# Patient Record
Sex: MALE | Race: WHITE
[De-identification: names, ages, dates, MRNs, and addresses within clinical notes are randomized per-mention and may not be internally consistent; named-entity substitution may affect disease eponyms.]

---

## 2017-05-30 PROCEDURE — 82570 ASSAY OF URINE CREATININE: CPT | Performed by: INTERNAL MEDICINE

## 2017-05-30 PROCEDURE — 82043 UR ALBUMIN QUANTITATIVE: CPT | Performed by: INTERNAL MEDICINE

## 2017-12-20 PROCEDURE — 81001 URINALYSIS AUTO W/SCOPE: CPT | Performed by: INTERNAL MEDICINE

## 2017-12-20 PROCEDURE — 82607 VITAMIN B-12: CPT | Performed by: INTERNAL MEDICINE

## 2017-12-20 PROCEDURE — 82746 ASSAY OF FOLIC ACID SERUM: CPT | Performed by: INTERNAL MEDICINE

## 2018-01-15 PROBLEM — I65.23 BILATERAL CAROTID ARTERY STENOSIS: Status: ACTIVE | Noted: 2018-01-15

## 2018-06-20 PROBLEM — I25.5 ISCHEMIC CARDIOMYOPATHY: Status: ACTIVE | Noted: 2018-06-20

## 2019-01-08 PROCEDURE — 81003 URINALYSIS AUTO W/O SCOPE: CPT | Performed by: INTERNAL MEDICINE

## 2019-01-08 PROCEDURE — 82570 ASSAY OF URINE CREATININE: CPT | Performed by: INTERNAL MEDICINE

## 2019-01-08 PROCEDURE — 82043 UR ALBUMIN QUANTITATIVE: CPT | Performed by: INTERNAL MEDICINE

## 2019-01-25 PROBLEM — Z95.810 PRESENCE OF AUTOMATIC CARDIOVERTER/DEFIBRILLATOR (AICD): Status: ACTIVE | Noted: 2019-01-25

## 2019-03-25 PROBLEM — Z95.810 ICD (IMPLANTABLE CARDIOVERTER-DEFIBRILLATOR) IN PLACE: Status: ACTIVE | Noted: 2019-01-25

## 2019-05-29 PROBLEM — R05.9 COUGH: Status: ACTIVE | Noted: 2019-05-29

## 2019-09-24 PROBLEM — G47.00 ORGANIC INSOMNIA: Status: ACTIVE | Noted: 2019-09-24

## 2019-10-24 PROBLEM — I48.0 PAROXYSMAL ATRIAL FIBRILLATION (HCC): Status: ACTIVE | Noted: 2019-10-24

## 2019-12-19 PROBLEM — D69.6 THROMBOCYTOPENIA, UNSPECIFIED (HCC): Status: ACTIVE | Noted: 2019-12-19

## 2020-01-01 ENCOUNTER — EXTERNAL RECORD (OUTPATIENT)
Dept: HEALTH INFORMATION MANAGEMENT | Facility: OTHER | Age: 80
End: 2020-01-01

## 2020-01-06 ENCOUNTER — TELEPHONE (OUTPATIENT)
Dept: CARDIOLOGY | Age: 80
End: 2020-01-06

## 2020-01-10 ENCOUNTER — TELEPHONE (OUTPATIENT)
Dept: CARDIOLOGY | Age: 80
End: 2020-01-10

## 2020-01-13 PROBLEM — R60.9 EDEMA: Status: ACTIVE | Noted: 2020-01-13

## 2020-01-13 PROBLEM — R06.02 SHORTNESS OF BREATH: Status: ACTIVE | Noted: 2020-01-13

## 2020-01-14 ENCOUNTER — TELEPHONE (OUTPATIENT)
Dept: CARDIOLOGY | Age: 80
End: 2020-01-14

## 2020-01-14 ENCOUNTER — OFFICE VISIT (OUTPATIENT)
Dept: CARDIOLOGY | Age: 80
End: 2020-01-14

## 2020-01-14 VITALS
HEIGHT: 71 IN | BODY MASS INDEX: 34.16 KG/M2 | DIASTOLIC BLOOD PRESSURE: 56 MMHG | HEART RATE: 74 BPM | SYSTOLIC BLOOD PRESSURE: 110 MMHG | WEIGHT: 244 LBS

## 2020-01-14 DIAGNOSIS — Z95.1 S/P CABG (CORONARY ARTERY BYPASS GRAFT): ICD-10-CM

## 2020-01-14 DIAGNOSIS — I25.10 CORONARY ARTERY DISEASE INVOLVING NATIVE CORONARY ARTERY OF NATIVE HEART WITHOUT ANGINA PECTORIS: ICD-10-CM

## 2020-01-14 DIAGNOSIS — R60.1 GENERALIZED EDEMA: ICD-10-CM

## 2020-01-14 DIAGNOSIS — Z79.4 TYPE 2 DIABETES MELLITUS WITH STAGE 3 CHRONIC KIDNEY DISEASE, WITH LONG-TERM CURRENT USE OF INSULIN (CMD): ICD-10-CM

## 2020-01-14 DIAGNOSIS — R06.02 SHORTNESS OF BREATH: Primary | ICD-10-CM

## 2020-01-14 DIAGNOSIS — I50.41 ACUTE COMBINED SYSTOLIC AND DIASTOLIC CHF, NYHA CLASS 4 (CMD): ICD-10-CM

## 2020-01-14 DIAGNOSIS — N18.30 TYPE 2 DIABETES MELLITUS WITH STAGE 3 CHRONIC KIDNEY DISEASE, WITH LONG-TERM CURRENT USE OF INSULIN (CMD): ICD-10-CM

## 2020-01-14 DIAGNOSIS — E11.22 TYPE 2 DIABETES MELLITUS WITH STAGE 3 CHRONIC KIDNEY DISEASE, WITH LONG-TERM CURRENT USE OF INSULIN (CMD): ICD-10-CM

## 2020-01-14 PROBLEM — E11.29 TYPE 2 DIABETES MELLITUS WITH KIDNEY COMPLICATION, WITH LONG-TERM CURRENT USE OF INSULIN (CMD): Status: ACTIVE | Noted: 2020-01-14

## 2020-01-14 PROCEDURE — 99205 OFFICE O/P NEW HI 60 MIN: CPT | Performed by: INTERNAL MEDICINE

## 2020-01-14 RX ORDER — CLOPIDOGREL BISULFATE 75 MG/1
75 TABLET ORAL DAILY
COMMUNITY

## 2020-01-14 RX ORDER — METOLAZONE 5 MG/1
5 TABLET ORAL 3 TIMES DAILY
COMMUNITY
End: 2020-01-17 | Stop reason: ALTCHOICE

## 2020-01-14 RX ORDER — AMIODARONE HYDROCHLORIDE 200 MG/1
200 TABLET ORAL DAILY
COMMUNITY

## 2020-01-14 RX ORDER — METOPROLOL SUCCINATE 25 MG/1
25 TABLET, EXTENDED RELEASE ORAL DAILY
COMMUNITY
End: 2020-02-20 | Stop reason: SDUPTHER

## 2020-01-14 RX ORDER — SPIRONOLACTONE 25 MG/1
25 TABLET ORAL DAILY
COMMUNITY
End: 2020-01-31 | Stop reason: SDUPTHER

## 2020-01-14 RX ORDER — INSULIN GLARGINE 100 [IU]/ML
INJECTION, SOLUTION SUBCUTANEOUS NIGHTLY
COMMUNITY

## 2020-01-14 SDOH — HEALTH STABILITY: MENTAL HEALTH: HOW OFTEN DO YOU HAVE A DRINK CONTAINING ALCOHOL?: NEVER

## 2020-01-14 ASSESSMENT — ENCOUNTER SYMPTOMS
SHORTNESS OF BREATH: 1
HEMOPTYSIS: 0
WEIGHT LOSS: 0
POOR WOUND HEALING: 1
BRUISES/BLEEDS EASILY: 0
HEMATOCHEZIA: 0
CHILLS: 0
ALLERGIC/IMMUNOLOGIC COMMENTS: NO NEW FOOD ALLERGIES
SUSPICIOUS LESIONS: 0
WEIGHT GAIN: 0
COUGH: 1
FEVER: 0

## 2020-01-15 ENCOUNTER — APPOINTMENT (OUTPATIENT)
Dept: CV DIAGNOSTICS | Facility: HOSPITAL | Age: 80
DRG: 286 | End: 2020-01-15
Attending: INTERNAL MEDICINE
Payer: MEDICARE

## 2020-01-15 ENCOUNTER — HOSPITAL ENCOUNTER (INPATIENT)
Facility: HOSPITAL | Age: 80
LOS: 8 days | Discharge: HOME HEALTH | DRG: 286 | End: 2020-01-23
Attending: INTERNAL MEDICINE | Admitting: INTERNAL MEDICINE
Payer: MEDICARE

## 2020-01-15 ENCOUNTER — APPOINTMENT (OUTPATIENT)
Dept: GENERAL RADIOLOGY | Facility: HOSPITAL | Age: 80
DRG: 286 | End: 2020-01-15
Attending: INTERNAL MEDICINE
Payer: MEDICARE

## 2020-01-15 DIAGNOSIS — I50.42 CHRONIC COMBINED SYSTOLIC AND DIASTOLIC CONGESTIVE HEART FAILURE (HCC): Primary | ICD-10-CM

## 2020-01-15 PROBLEM — I50.23 ACUTE ON CHRONIC SYSTOLIC CHF (CONGESTIVE HEART FAILURE), NYHA CLASS 3 (HCC): Status: ACTIVE | Noted: 2020-01-15

## 2020-01-15 LAB
ALBUMIN SERPL-MCNC: 3.2 G/DL (ref 3.4–5)
ALBUMIN/GLOB SERPL: 0.7 {RATIO} (ref 1–2)
ALP LIVER SERPL-CCNC: 109 U/L (ref 45–117)
ALT SERPL-CCNC: 23 U/L (ref 16–61)
ANION GAP SERPL CALC-SCNC: 7 MMOL/L (ref 0–18)
AST SERPL-CCNC: 33 U/L (ref 15–37)
BASOPHILS # BLD AUTO: 0.03 X10(3) UL (ref 0–0.2)
BASOPHILS NFR BLD AUTO: 0.5 %
BILIRUB SERPL-MCNC: 0.8 MG/DL (ref 0.1–2)
BILIRUB UR QL STRIP.AUTO: NEGATIVE
BUN BLD-MCNC: 35 MG/DL (ref 7–18)
BUN/CREAT SERPL: 16.7 (ref 10–20)
CALCIUM BLD-MCNC: 8.5 MG/DL (ref 8.5–10.1)
CHLORIDE SERPL-SCNC: 107 MMOL/L (ref 98–112)
CLARITY UR REFRACT.AUTO: CLEAR
CO2 SERPL-SCNC: 23 MMOL/L (ref 21–32)
COLOR UR AUTO: YELLOW
CREAT BLD-MCNC: 2.09 MG/DL (ref 0.7–1.3)
CREAT UR-SCNC: 56.4 MG/DL
CREAT UR-SCNC: 56.4 MG/DL
DEPRECATED RDW RBC AUTO: 68.2 FL (ref 35.1–46.3)
EOSINOPHIL # BLD AUTO: 0.13 X10(3) UL (ref 0–0.7)
EOSINOPHIL NFR BLD AUTO: 2.3 %
ERYTHROCYTE [DISTWIDTH] IN BLOOD BY AUTOMATED COUNT: 20.6 % (ref 11–15)
EST. AVERAGE GLUCOSE BLD GHB EST-MCNC: 140 MG/DL (ref 68–126)
GLOBULIN PLAS-MCNC: 4.4 G/DL (ref 2.8–4.4)
GLUCOSE BLD-MCNC: 144 MG/DL (ref 70–99)
GLUCOSE BLD-MCNC: 176 MG/DL (ref 70–99)
GLUCOSE UR STRIP.AUTO-MCNC: NEGATIVE MG/DL
HBA1C MFR BLD HPLC: 6.5 % (ref ?–5.7)
HCT VFR BLD AUTO: 33.2 % (ref 39–53)
HGB BLD-MCNC: 10.4 G/DL (ref 13–17.5)
IMM GRANULOCYTES # BLD AUTO: 0.03 X10(3) UL (ref 0–1)
IMM GRANULOCYTES NFR BLD: 0.5 %
KETONES UR STRIP.AUTO-MCNC: NEGATIVE MG/DL
LEUKOCYTE ESTERASE UR QL STRIP.AUTO: NEGATIVE
LYMPHOCYTES # BLD AUTO: 0.84 X10(3) UL (ref 1–4)
LYMPHOCYTES NFR BLD AUTO: 15 %
M PROTEIN MFR SERPL ELPH: 7.6 G/DL (ref 6.4–8.2)
MCH RBC QN AUTO: 28.8 PG (ref 26–34)
MCHC RBC AUTO-ENTMCNC: 31.3 G/DL (ref 31–37)
MCV RBC AUTO: 92 FL (ref 80–100)
MONOCYTES # BLD AUTO: 0.61 X10(3) UL (ref 0.1–1)
MONOCYTES NFR BLD AUTO: 10.9 %
NEUTROPHILS # BLD AUTO: 3.97 X10 (3) UL (ref 1.5–7.7)
NEUTROPHILS # BLD AUTO: 3.97 X10(3) UL (ref 1.5–7.7)
NEUTROPHILS NFR BLD AUTO: 70.8 %
NITRITE UR QL STRIP.AUTO: NEGATIVE
NT-PROBNP SERPL-MCNC: 4257 PG/ML (ref ?–450)
OSMOLALITY SERPL CALC.SUM OF ELEC: 296 MOSM/KG (ref 275–295)
PH UR STRIP.AUTO: 7 [PH] (ref 4.5–8)
PLATELET # BLD AUTO: 123 10(3)UL (ref 150–450)
PLATELET MORPHOLOGY: NORMAL
POTASSIUM SERPL-SCNC: 5.3 MMOL/L (ref 3.5–5.1)
PROT UR STRIP.AUTO-MCNC: 30 MG/DL
PROT UR-MCNC: 30.7 MG/DL
PROT/CREAT UR-RTO: 0.54
RBC # BLD AUTO: 3.61 X10(6)UL (ref 3.8–5.8)
RBC UR QL AUTO: NEGATIVE
SODIUM SERPL-SCNC: 131 MMOL/L
SODIUM SERPL-SCNC: 137 MMOL/L (ref 136–145)
SP GR UR STRIP.AUTO: 1.01 (ref 1–1.03)
UROBILINOGEN UR STRIP.AUTO-MCNC: <2 MG/DL
WBC # BLD AUTO: 5.6 X10(3) UL (ref 4–11)

## 2020-01-15 PROCEDURE — 71045 X-RAY EXAM CHEST 1 VIEW: CPT | Performed by: INTERNAL MEDICINE

## 2020-01-15 PROCEDURE — 93306 TTE W/DOPPLER COMPLETE: CPT | Performed by: INTERNAL MEDICINE

## 2020-01-15 PROCEDURE — 99233 SBSQ HOSP IP/OBS HIGH 50: CPT | Performed by: INTERNAL MEDICINE

## 2020-01-15 PROCEDURE — 99223 1ST HOSP IP/OBS HIGH 75: CPT | Performed by: INTERNAL MEDICINE

## 2020-01-15 RX ORDER — FLUTICASONE PROPIONATE 50 MCG
1-2 SPRAY, SUSPENSION (ML) NASAL DAILY
Status: DISCONTINUED | OUTPATIENT
Start: 2020-01-15 | End: 2020-01-23

## 2020-01-15 RX ORDER — TEMAZEPAM 15 MG/1
15 CAPSULE ORAL NIGHTLY PRN
Status: DISCONTINUED | OUTPATIENT
Start: 2020-01-15 | End: 2020-01-23

## 2020-01-15 RX ORDER — AMIODARONE HYDROCHLORIDE 200 MG/1
200 TABLET ORAL DAILY
Status: DISCONTINUED | OUTPATIENT
Start: 2020-01-16 | End: 2020-01-23

## 2020-01-15 RX ORDER — MILRINONE LACTATE 0.2 MG/ML
0.38 INJECTION, SOLUTION INTRAVENOUS CONTINUOUS
Status: DISCONTINUED | OUTPATIENT
Start: 2020-01-15 | End: 2020-01-20

## 2020-01-15 RX ORDER — DEXTROSE MONOHYDRATE 25 G/50ML
50 INJECTION, SOLUTION INTRAVENOUS
Status: DISCONTINUED | OUTPATIENT
Start: 2020-01-15 | End: 2020-01-23

## 2020-01-15 RX ORDER — HEPARIN SODIUM 5000 [USP'U]/ML
5000 INJECTION, SOLUTION INTRAVENOUS; SUBCUTANEOUS EVERY 12 HOURS SCHEDULED
Status: DISCONTINUED | OUTPATIENT
Start: 2020-01-15 | End: 2020-01-23

## 2020-01-15 RX ORDER — CLOPIDOGREL BISULFATE 75 MG/1
75 TABLET ORAL
Status: DISCONTINUED | OUTPATIENT
Start: 2020-01-16 | End: 2020-01-23

## 2020-01-15 RX ORDER — NITROGLYCERIN 0.4 MG/1
0.4 TABLET SUBLINGUAL EVERY 5 MIN PRN
Status: DISCONTINUED | OUTPATIENT
Start: 2020-01-15 | End: 2020-01-23

## 2020-01-15 RX ORDER — ACETAMINOPHEN 325 MG/1
650 TABLET ORAL EVERY 6 HOURS PRN
Status: DISCONTINUED | OUTPATIENT
Start: 2020-01-15 | End: 2020-01-23

## 2020-01-15 RX ORDER — SPIRONOLACTONE 25 MG/1
25 TABLET ORAL DAILY
Status: DISCONTINUED | OUTPATIENT
Start: 2020-01-15 | End: 2020-01-19

## 2020-01-15 RX ORDER — ALBUTEROL SULFATE 90 UG/1
2 AEROSOL, METERED RESPIRATORY (INHALATION) EVERY 6 HOURS PRN
Status: DISCONTINUED | OUTPATIENT
Start: 2020-01-15 | End: 2020-01-23

## 2020-01-15 RX ORDER — ENOXAPARIN SODIUM 100 MG/ML
40 INJECTION SUBCUTANEOUS DAILY
Status: DISCONTINUED | OUTPATIENT
Start: 2020-01-15 | End: 2020-01-15

## 2020-01-15 NOTE — CONSULTS
[facility]  Report of Consultation    Esther Koo Patient Status:  Inpatient    1940 MRN [id number]   [facility] 2NE-A Attending Letty Rutherford MD   Hosp Day # 0 PCP Tete Lopes MD     Reason for Consultation:  CKD GRAFT N/A 7/8/2015    Performed by Jennifer Salvador MD at [address] (EXTERNAL)  12/09/2019    PTA of the chronic occluded left mid to distal SFA, whcih was atherectomized , Dilated from 100% down **OR** glucose (DEX4) oral liquid 30 g, 30 g, Oral, Q15 Min PRN **OR** Glucose-Vitamin C (DEX-4) chewable tab 8 tablet, 8 tablet, Oral, Q15 Min PRN  •  Insulin Aspart Pen (NOVOLOG) 100 UNIT/ML flexpen 1-5 Units, 1-5 Units, Subcutaneous, TID CC and HS  •  A (H)     Blood Urea Nitrogen   Date Value   01/13/2020 35.0 mg/dL (H)   12/04/2019 35.0 mg/dL (H)   08/22/2019 35.0 mg/dL (H)   01/16/2019 20 MG/DL     Creatinine (mg/dL)   Date Value   01/15/2020 2.09 (H)   01/13/2020 1.85 (H)   12/04/2019 2.07 (H)   08/22

## 2020-01-15 NOTE — H&P
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  HEENT:  Eyes:  No diplopia or blurred vision. ENT:  No earache, sore throat or runny nose. CARDIOVASCULAR:  No chest pain  RESPIRATORY:  No cough, +shortness of breath, +orthopnea. GASTROINTESTINAL:  No nausea, vomiting or diarrhea.   Naomy Ala 01/15/2020    AST 33 01/15/2020    ALT 23 01/15/2020       CXR:  PENDING  Radiology: No results found. Outpatient records or previous hospital records reviewed.    [facility] hospitalist to continue to follow patient while in house  A total of 70 minutes miguelito

## 2020-01-15 NOTE — PLAN OF CARE
NURSING ADMISSION NOTE      Patient admitted via [facility] to room. Safety precautions initiated. Bed in low position. Call light in reach. Updated hisotry and pta meds.  Gave report to Areli Jiménez

## 2020-01-15 NOTE — H&P
Progress Notes  - documented in this encounter  Nyla Beltrán MD - 01/14/2020 12:00 PM CST  Formatting of this note might be different from the original.    [address]    PCP: Gabriella Al MD    Chief Complaint   Patient presents • Atorvastatin MYALGIA   • Furosemide Other (See Comments)   • Valsartan Other (See Comments)   • Ezetimibe Other (See Comments)   • Fish Oil Other (See Comments)   • Insulin Other (See Comments)   • Phenol Other (See Comments)     Presenting Medications scleral icterus  Neck: JVP normal, no carotid bruit bilaterally  Lungs: decrease base  Cardiac: Syst murmur S3   Abdomen: Liver ? enlarged  Musculoskeletal: ambulatory  Extremities: pulses intact 4+ EDEMA  Skin: Warm  Neuro: A & O  Psychiatric: Normal affe just established care with Dr. Guanaco Zayas in clinic and was found to be fluid overloaded. The patient states in the last year he has been admitted twice for fluid overload. He had been following at [address] [place] [address],[address] in [place]. He was just admitted last month.  After b

## 2020-01-16 ENCOUNTER — APPOINTMENT (OUTPATIENT)
Dept: ULTRASOUND IMAGING | Facility: HOSPITAL | Age: 80
DRG: 286 | End: 2020-01-16
Attending: INTERNAL MEDICINE
Payer: MEDICARE

## 2020-01-16 LAB
ANION GAP SERPL CALC-SCNC: 8 MMOL/L (ref 0–18)
BASOPHILS # BLD AUTO: 0.02 X10(3) UL (ref 0–0.2)
BASOPHILS NFR BLD AUTO: 0.4 %
BUN BLD-MCNC: 38 MG/DL (ref 7–18)
BUN/CREAT SERPL: 17.9 (ref 10–20)
CALCIUM BLD-MCNC: 8.9 MG/DL (ref 8.5–10.1)
CHLORIDE SERPL-SCNC: 105 MMOL/L (ref 98–112)
CO2 SERPL-SCNC: 24 MMOL/L (ref 21–32)
CREAT BLD-MCNC: 2.12 MG/DL (ref 0.7–1.3)
DEPRECATED RDW RBC AUTO: 67.2 FL (ref 35.1–46.3)
EOSINOPHIL # BLD AUTO: 0.12 X10(3) UL (ref 0–0.7)
EOSINOPHIL NFR BLD AUTO: 2.3 %
ERYTHROCYTE [DISTWIDTH] IN BLOOD BY AUTOMATED COUNT: 20.3 % (ref 11–15)
GLUCOSE BLD-MCNC: 125 MG/DL (ref 70–99)
GLUCOSE BLD-MCNC: 139 MG/DL (ref 70–99)
GLUCOSE BLD-MCNC: 140 MG/DL (ref 70–99)
GLUCOSE BLD-MCNC: 211 MG/DL (ref 70–99)
GLUCOSE BLD-MCNC: 272 MG/DL (ref 70–99)
HAV IGM SER QL: 2.2 MG/DL (ref 1.6–2.6)
HCT VFR BLD AUTO: 32.5 % (ref 39–53)
HGB BLD-MCNC: 10.3 G/DL (ref 13–17.5)
IMM GRANULOCYTES # BLD AUTO: 0.02 X10(3) UL (ref 0–1)
IMM GRANULOCYTES NFR BLD: 0.4 %
LYMPHOCYTES # BLD AUTO: 1 X10(3) UL (ref 1–4)
LYMPHOCYTES NFR BLD AUTO: 18.9 %
MCH RBC QN AUTO: 28.7 PG (ref 26–34)
MCHC RBC AUTO-ENTMCNC: 31.7 G/DL (ref 31–37)
MCV RBC AUTO: 90.5 FL (ref 80–100)
MONOCYTES # BLD AUTO: 0.68 X10(3) UL (ref 0.1–1)
MONOCYTES NFR BLD AUTO: 12.8 %
NEUTROPHILS # BLD AUTO: 3.46 X10 (3) UL (ref 1.5–7.7)
NEUTROPHILS # BLD AUTO: 3.46 X10(3) UL (ref 1.5–7.7)
NEUTROPHILS NFR BLD AUTO: 65.2 %
OSMOLALITY SERPL CALC.SUM OF ELEC: 295 MOSM/KG (ref 275–295)
PLATELET # BLD AUTO: 140 10(3)UL (ref 150–450)
POTASSIUM SERPL-SCNC: 4.8 MMOL/L (ref 3.5–5.1)
RBC # BLD AUTO: 3.59 X10(6)UL (ref 3.8–5.8)
SODIUM SERPL-SCNC: 137 MMOL/L (ref 136–145)
WBC # BLD AUTO: 5.3 X10(3) UL (ref 4–11)

## 2020-01-16 PROCEDURE — 99232 SBSQ HOSP IP/OBS MODERATE 35: CPT | Performed by: INTERNAL MEDICINE

## 2020-01-16 PROCEDURE — 93970 EXTREMITY STUDY: CPT | Performed by: INTERNAL MEDICINE

## 2020-01-16 PROCEDURE — 99233 SBSQ HOSP IP/OBS HIGH 50: CPT | Performed by: INTERNAL MEDICINE

## 2020-01-16 RX ORDER — ONDANSETRON 2 MG/ML
4 INJECTION INTRAMUSCULAR; INTRAVENOUS EVERY 6 HOURS PRN
Status: DISCONTINUED | OUTPATIENT
Start: 2020-01-16 | End: 2020-01-23

## 2020-01-16 RX ORDER — METOPROLOL SUCCINATE 25 MG/1
25 TABLET, EXTENDED RELEASE ORAL NIGHTLY
Status: DISCONTINUED | OUTPATIENT
Start: 2020-01-16 | End: 2020-01-23

## 2020-01-16 NOTE — PROGRESS NOTES
[facility]  Advanced Heart Failure Progress Note    Dakotah Bhatt Patient Status:  Inpatient    1940 MRN [id number]   [facility] 2NE-A Attending Kim Mccollum MD   Hosp Day # 1 PCP Amadou Cardenas MD     Subjective:  Has 92.0 01/15/2020    MCH 28.8 01/15/2020    MCHC 31.3 01/15/2020    RDW 20.6 01/15/2020    .0 01/15/2020     Lab Results   Component Value Date    INR 1.2 01/16/2019    INR 1.1 01/08/2019    INR 1.56 (H) 07/08/2015     No results for input(s): BNPML i Continuous  spironolactone (ALDACTONE) tab 25 mg, 25 mg, Oral, Daily  amiodarone HCl (PACERONE) tab 200 mg, 200 mg, Oral, Daily  metoprolol succinate (Toprol XL) partial tablet 12.5 mg, 12.5 mg, Oral, Nightly  milrinone (PRIMACOR) 20mg in D5W 100 mL premix Dionte Negron M.D., DAGMAR., OJ., F.E.S.C. Medical Director, Heart Failure Research, [address] Director, [address]  [place] [address], [address]  [address]

## 2020-01-16 NOTE — PROGRESS NOTES
[facility]  Progress Note    Jonna Monae Patient Status:  Inpatient    1940 MRN [id number]   [facility] 2NE-A Attending Cynthia Buck MD   Hosp Day # 1 PCP Kishan Alberto MD     No acute events overnight  Feels well pressure 116/59, pulse 86, temperature 97.8 °F (36.6 °C), temperature source Oral, resp. rate 20, weight 233 lb 11 oz (106 kg), SpO2 96 %. General: No acute distress. Alert and oriented x 3. HEENT: Moist mucous membranes. EOM-I.  PERRL  Neck: No lymphaden DM/HTN/cardio-renal sydrome; UA fairly bland; does have microalbuminuria  Suspect SVETLANA due to acute CHF and related decreased renal perfusion  Cr stable w/ ongoing diuresis  - continue bumex gtt - he is still roughly about 20 lbs over his typical weight  -

## 2020-01-16 NOTE — PLAN OF CARE
Rcv'd A/O/3  States I'm cold and shivering. Blankets and warm packs given  On tele with SR  LE +4 bilateral to ABD  Bilateral legs wrapped with karlex and ace wraps  Reported rt calf with open blister covered with mepilex.  Rt toe necrotic with betadine   L

## 2020-01-16 NOTE — PLAN OF CARE
Patient is a direct admit from Dr Liyah Cho office. [facility] hospitalists, Dr Summer Bonilla and Dr Viridiana Pizano consulted. Patient has a hx of renal insufficiency, heart failure, stent to left leg-necrotic toe with betadine, cabg and AICD a year ago.    NSR  Primacor and Diuril

## 2020-01-16 NOTE — PROGRESS NOTES
[facility] Hospitalist Team  Progress Note      Kerline Pair  7/26/1940    Assessment/Plan:     #LE edema, JARVIS likely 2/2   #Acute on chronic systolic and diastolic HF s/p ICD  -iv diuretics per cards -- on bumex gtt  -chest xr with pulm edema  -echo with wo Subcutaneous 2 times per day   • Insulin Aspart Pen  1-5 Units Subcutaneous TID CC and HS   • Fluticasone Propionate  1-2 spray Nasal Daily   • Clopidogrel Bisulfate  75 mg Oral Daily   • insulin glargine  10 Units Subcutaneous Nightly   • spironolactone

## 2020-01-17 LAB
ANION GAP SERPL CALC-SCNC: 6 MMOL/L (ref 0–18)
ATRIAL RATE: 81 BPM
BUN BLD-MCNC: 44 MG/DL (ref 7–18)
BUN/CREAT SERPL: 18.4 (ref 10–20)
CALCIUM BLD-MCNC: 9.6 MG/DL (ref 8.5–10.1)
CHLORIDE SERPL-SCNC: 100 MMOL/L (ref 98–112)
CO2 SERPL-SCNC: 29 MMOL/L (ref 21–32)
CREAT BLD-MCNC: 2.39 MG/DL (ref 0.7–1.3)
GLUCOSE BLD-MCNC: 145 MG/DL (ref 70–99)
GLUCOSE BLD-MCNC: 148 MG/DL (ref 70–99)
GLUCOSE BLD-MCNC: 159 MG/DL (ref 70–99)
GLUCOSE BLD-MCNC: 265 MG/DL (ref 70–99)
HAV IGM SER QL: 2 MG/DL (ref 1.6–2.6)
OSMOLALITY SERPL CALC.SUM OF ELEC: 294 MOSM/KG (ref 275–295)
P-R INTERVAL: 232 MS
POTASSIUM SERPL-SCNC: 4.7 MMOL/L (ref 3.5–5.1)
Q-T INTERVAL: 392 MS
QRS DURATION: 98 MS
QTC CALCULATION (BEZET): 455 MS
R AXIS: 39 DEGREES
SODIUM SERPL-SCNC: 128 MMOL/L
SODIUM SERPL-SCNC: 135 MMOL/L (ref 136–145)
T AXIS: 168 DEGREES
VENTRICULAR RATE: 81 BPM

## 2020-01-17 PROCEDURE — 99233 SBSQ HOSP IP/OBS HIGH 50: CPT | Performed by: INTERNAL MEDICINE

## 2020-01-17 NOTE — PROGRESS NOTES
[facility] Hospitalist Team  Progress Note      Jj Silver  7/26/1940    Assessment/Plan:     #LE edema, JARVIS likely 2/2   #Acute on chronic systolic and diastolic HF s/p ICD  -iv diuretics per cards -- on bumex gtt  -chest xr with pulm edema  -echo with wo 23.0 24.0 29.0   BUN 35.0* 35* 38* 44*   CREATSERUM 1.85* 2.09* 2.12* 2.39*   CA 8.6 8.5 8.9 9.6   MG  --   --  2.2 2.0   * 176* 125* 145*       Recent Labs   Lab 01/15/20  1404   ALT 23   AST 33   ALB 3.2*       Recent Labs   Lab 01/16/20  0721 01/

## 2020-01-17 NOTE — PLAN OF CARE
Aox4, room air, no complaints of chest pain or SOB, VSS, BLE edema +2 and redness, bumex and milirone infusing, daily weight, wound care to see for right toe, ID consult- ancef started, NSR on tele, safety precautions in place; bed in lowest position, call for suctioning and perform as needed  - Assess and instruct to report SOB or any respiratory difficulty  - Respiratory Therapy support as indicated  - Manage/alleviate anxiety  - Monitor for signs/symptoms of CO2 retention  Outcome: Progressing     Problem

## 2020-01-17 NOTE — CONSULTS
[facility]  Report of Inpatient Wound Care Consultation     Liset Mejia Patient Status:  Inpatient    1940 MRN [id number]   [facility] 2NE-A Attending Mary Ellis MD   Hosp Day # 2 PCP Grover Winston MD     REASON 2002   • COLONOSCOPY     • COLONOSCOPY     • EP DUAL CHAMBER ICD  01/16/2019    [facility] dual chamber ICD implant [employer]SC[employer].  Dr. Nate Sky   • [address] N/A 7/8/2015    Performed by Monica Chester MD at [id number] [place] [address] treat.\"  Patient with edema to B LE, stasis changes to B LE, no open wounds on L LE, dry eschar to the L distal 2nd toe, stable.    R LE with small wound to the tibial region, measures at (3.5cm x 2.1cm x 0.1cm) with 100% red, flat edges, no odor, scant dr

## 2020-01-17 NOTE — CM/SW NOTE
01/17/20 1000   CM/SW Referral Data   Referral Source Physician   Reason for Referral Discharge planning   Informant Patient   Social History   Recreational Drug/Alcohol Use no   Major Changes Last 6 Months no   Domestic/Partner Violence no   Suicidal I

## 2020-01-17 NOTE — PLAN OF CARE
Pt and VS remained stable this shift. Denies CP/SOB. Sat maintained in 90's on RA. Bumex and primacor gtt infusing, Appears to diuresis well. POC updated. Will cont to monitor.

## 2020-01-17 NOTE — DIETARY NOTE
Starr 34     Admitting diagnosis:  chf   Acute on chronic systolic CHF (congestive heart failure), NYHA class 3 (HCC)    Ht:  5' 10 \"  Wt: 96.6 kg (212 lb 15.4 oz).  This is 128 % of IBW  Body mass index is

## 2020-01-17 NOTE — PROGRESS NOTES
Pt c/o abdominal discomfort but improving. States, \"I think it was the lunch I had today\". A/Ox4. VSS. Breath sounds clear and diminished bilaterally. Room air. NSR on telemetry. Bilateral lower extremity edema +2 pitting.  L Leg redness, appearing to l

## 2020-01-17 NOTE — CONSULTS
INFECTIOUS DISEASE CONSULT NOTE    Robin Bonilla Patient Status:  Inpatient    1940 MRN [id number]   [facility] 2NE-A Attending Caleb Iniguez MD   Hosp Day # 2 PCP Lisa Ryan [facility] dual chamber ICD implant [employer]SC[employer].  Dr. Sonia Ferrari   • [address] N/A 7/8/2015    Performed by Dwight Antunez MD at [address] [facility] (EXTERNAL)  12/09/2019    PTA of the  •  Heparin Sodium (Porcine) 5000 UNIT/ML injection 5,000 Units, 5,000 Units, Subcutaneous, 2 times per day  •  glucose (DEX4) oral liquid 15 g, 15 g, Oral, Q15 Min PRN **OR** Glucose-Vitamin C (DEX-4) chewable tab 4 tablet, 4 tablet, Oral, Q15 Min PRN **OR Integument:  Erythema both feet, much better with leg elevation but with persistent erythema to L inner thigh.  RLE with compression dressings (not removed)        Laboratory Data:    Recent Labs   Lab 01/15/20  1404 01/16/20  1420   RBC 3.61* 3.59*   HGB 1 Result Date: 1/15/2020  PROCEDURE:  XR CHEST AP PORTABLE  (CPT=71045)  TECHNIQUE:  AP chest radiograph was obtained. COMPARISON:  [facility] , XR, XR CHEST PA + LAT CHEST (CPT=71020), 7/20/2015, 10:35.   INDICATIONS:  CHF  PATIENT STATED HISTORY: (As transcrib

## 2020-01-17 NOTE — PROGRESS NOTES
[facility]  Progress Note    Pat Cheek Patient Status:  Inpatient    1940 MRN [id number]   [facility] 2NE-A Attending Lilibeth Lawler MD   Hosp Day # 2 PCP Madina Yu MD     Feels better today  Excellent response Physical Exam:  Vital signs: Blood pressure 97/37, pulse 89, temperature 97.9 °F (36.6 °C), temperature source Oral, resp. rate 18, weight 212 lb 15.4 oz (96.6 kg), SpO2 97 %. General: No acute distress. Alert and oriented x 3.   HEENT: Moist mucous me MD  1/17/2020

## 2020-01-17 NOTE — PLAN OF CARE
Assumed patient care at [facility]. Vital signs stable. Patient alert and oriented x 4. Patient with good urine output. Patient complaining of mid abdominal pain and nausea - states he thought it was something he ate.   Patient was pointing right at umbilicus

## 2020-01-17 NOTE — PROGRESS NOTES
· [facility]      Subjective:  Dyspnea improving, able to lie flatter edema improving    Objective:  93/48  Afebrile  SR/ST  I/O incomplete  Weight down ? 20#  BUN/cr 44/2.39 K 4.7    Neuro:  Awake/alert  HEENT: JVD  Cardiac: S1 S2 regular 2/6 increased to 0.375 mcg/kg/min to better help with CHF tuneup. Patient had CABG 20 years ago. Also history of PCI. Patient denies ICD shocks. He has underlying ischemic cardiomyopathy with LVEF of 25%.   His chronic kidney disease, stable with CHF tuneup

## 2020-01-17 NOTE — PROGRESS NOTES
01/17/20 1530   Clinical Encounter Type   Visited With Patient  (Spent time with patient asking about his spiritual needs and concerns. Patient is happy to have his wife to care for him in addition to a caregiver at home. Patient is peaceful.  He prays d

## 2020-01-17 NOTE — PLAN OF CARE
Problem: Patient/Family Goals  Goal: Patient/Family Long Term Goal  Description  Patient's Long Term Goal: \"able to not have so much fluid on my legs\"    Interventions:  - HF protocol  - See additional Care Plan goals for specific interventions   Outco limits  Description  INTERVENTIONS:  - Monitor labs and rhythm and assess patient for signs and symptoms of electrolyte imbalances  - Administer electrolyte replacement as ordered  - Monitor response to electrolyte replacements, including rhythm and repeat

## 2020-01-18 LAB
ANION GAP SERPL CALC-SCNC: 9 MMOL/L (ref 0–18)
BUN BLD-MCNC: 42 MG/DL (ref 7–18)
BUN/CREAT SERPL: 17.1 (ref 10–20)
CALCIUM BLD-MCNC: 9.6 MG/DL (ref 8.5–10.1)
CHLORIDE SERPL-SCNC: 95 MMOL/L (ref 98–112)
CO2 SERPL-SCNC: 30 MMOL/L (ref 21–32)
CREAT BLD-MCNC: 2.45 MG/DL (ref 0.7–1.3)
GLUCOSE BLD-MCNC: 141 MG/DL (ref 70–99)
GLUCOSE BLD-MCNC: 142 MG/DL (ref 70–99)
GLUCOSE BLD-MCNC: 189 MG/DL (ref 70–99)
GLUCOSE BLD-MCNC: 191 MG/DL (ref 70–99)
GLUCOSE BLD-MCNC: 274 MG/DL (ref 70–99)
HAV IGM SER QL: 1.8 MG/DL (ref 1.6–2.6)
OSMOLALITY SERPL CALC.SUM OF ELEC: 291 MOSM/KG (ref 275–295)
POTASSIUM SERPL-SCNC: 4.3 MMOL/L (ref 3.5–5.1)
SODIUM SERPL-SCNC: 134 MMOL/L (ref 136–145)

## 2020-01-18 PROCEDURE — 99233 SBSQ HOSP IP/OBS HIGH 50: CPT | Performed by: INTERNAL MEDICINE

## 2020-01-18 PROCEDURE — 99232 SBSQ HOSP IP/OBS MODERATE 35: CPT | Performed by: INTERNAL MEDICINE

## 2020-01-18 RX ORDER — HYDROCODONE BITARTRATE AND ACETAMINOPHEN 5; 325 MG/1; MG/1
1 TABLET ORAL EVERY 4 HOURS PRN
Status: DISCONTINUED | OUTPATIENT
Start: 2020-01-18 | End: 2020-01-23

## 2020-01-18 RX ORDER — POLYETHYLENE GLYCOL 3350 17 G/17G
17 POWDER, FOR SOLUTION ORAL DAILY
Status: DISCONTINUED | OUTPATIENT
Start: 2020-01-18 | End: 2020-01-23

## 2020-01-18 NOTE — PROGRESS NOTES
[facility] Hospitalist Team  Progress Note      Dwaine Hunter  7/26/1940    Assessment/Plan:     #LE edema, JARVIS likely 2/2   #Acute on chronic systolic and diastolic HF s/p ICD  -iv diuretics per cards -- on bumex gtt, decreased today  -chest xr with pulm [place] CO2 25.6 23.0 24.0 29.0 30.0   BUN 35.0* 35* 38* 44* 42*   CREATSERUM 1.85* 2.09* 2.12* 2.39* 2.45*   CA 8.6 8.5 8.9 9.6 9.6   MG  --   --  2.2 2.0 1.8   * 176* 125* 145* 142*       Recent Labs   Lab 01/15/20  1404   ALT 23   AST 33   ALB 3.2*

## 2020-01-18 NOTE — PROGRESS NOTES
[facility]  Cardiology Progress Note    Subjective:  No chest pain or shortness of breath. Weight down over 40lbs thus far.     Objective:  /49 (BP Location: Right arm)   Pulse 92   Temp 97.6 °F (36.4 °C) (Oral)   Resp 18   Wt 201 lb 8 oz (91.4 Milrinone. Net weight loss ~43lbs.   Will need RHC when closer to euvolemia - likely can proceed early next week  · ICMP w/ AICD  · Mild to Moderate MR  · Moderate pHTN w/ moderate TR  · CKD Dr. Lissette Villanueva is managing diuretics  · CAD s/p CABG 2015 years and P MD SEE/KUMARS

## 2020-01-18 NOTE — PLAN OF CARE
Tele monitoring. I/o. Daily weight. Bumex 1 mg/hr (8 cc/hr)/primacor gtt 0.375 mcg/kg/hr (12.5 cc/hr). 1500 cc fluid restriction. Gluco scan qid. Metroprolol held this evening due to sbp 82/32. Will continue to monitor. Denies lightheadedness or dizziness. ventilation and oxygenation  Description  INTERVENTIONS:  - Assess for changes in respiratory status  - Assess for changes in mentation and behavior  - Position to facilitate oxygenation and minimize respiratory effort  - Oxygen supplementation based on ox

## 2020-01-18 NOTE — PROGRESS NOTES
[facility]  Nephrology Progress Note    Harjinder Pleasant Patient Status:  Inpatient    1940 MRN [id number]   [facility] 2NE-A Attending Leticia Jovel MD   Hosp Day # 3 PCP Adam Miller MD       SUBJECTIVE:  UOP remains e 149* 176* 125* 145* 142*       Recent Labs   Lab 01/15/20  1404   ALT 23   AST 33   ALB 3.2*       Recent Labs   Lab 01/17/20  1137 01/17/20  1828 01/17/20  2106 01/18/20  0647 01/18/20  1147   PGLU 159* 148* 265* 141* 274*       Meds:   PEG 3350 (MIRALAX) Oral, Daily  amiodarone HCl (PACERONE) tab 200 mg, 200 mg, Oral, Daily  milrinone (PRIMACOR) 20mg in D5W 100 mL premix infusion, 0.375 mcg/kg/min, Intravenous, Continuous          Impression/Plan:    #1.   CKD IV- hx CKD due to DM/HTN/decr renal perfusion w

## 2020-01-18 NOTE — PLAN OF CARE
Aox4, room air, no complaints of chest pain or SOB, NSR on tele, VSS, IV bumex, IV milirone, CR increased, IV bumex drip decreased 0.5 mg/hr, IV ancef cellulitis, Norco given for leg pain, possible LHC Mon or Tues, safety precautions in place; bed in [place] Assess the need for suctioning and perform as needed  - Assess and instruct to report SOB or any respiratory difficulty  - Respiratory Therapy support as indicated  - Manage/alleviate anxiety  - Monitor for signs/symptoms of CO2 retention  Outcome: Mack

## 2020-01-18 NOTE — VASCULAR ACCESS
Consulted to place a dificult IV. Per primary RN, Tala Page, pt has 2 working Iv's at this time and another is not needed. Will Dc consult.

## 2020-01-19 LAB
ANION GAP SERPL CALC-SCNC: 9 MMOL/L (ref 0–18)
ATRIAL RATE: 74 BPM
BASOPHILS # BLD AUTO: 0.02 X10(3) UL (ref 0–0.2)
BASOPHILS NFR BLD AUTO: 0.4 %
BUN BLD-MCNC: 45 MG/DL (ref 7–18)
BUN/CREAT SERPL: 17 (ref 10–20)
CALCIUM BLD-MCNC: 9.8 MG/DL (ref 8.5–10.1)
CHLORIDE SERPL-SCNC: 91 MMOL/L (ref 98–112)
CO2 SERPL-SCNC: 31 MMOL/L (ref 21–32)
CREAT BLD-MCNC: 2.65 MG/DL (ref 0.7–1.3)
DEPRECATED RDW RBC AUTO: 66.5 FL (ref 35.1–46.3)
EOSINOPHIL # BLD AUTO: 0.08 X10(3) UL (ref 0–0.7)
EOSINOPHIL NFR BLD AUTO: 1.6 %
ERYTHROCYTE [DISTWIDTH] IN BLOOD BY AUTOMATED COUNT: 19.7 % (ref 11–15)
GLUCOSE BLD-MCNC: 171 MG/DL (ref 70–99)
GLUCOSE BLD-MCNC: 180 MG/DL (ref 70–99)
GLUCOSE BLD-MCNC: 195 MG/DL (ref 70–99)
GLUCOSE BLD-MCNC: 228 MG/DL (ref 70–99)
GLUCOSE BLD-MCNC: 238 MG/DL (ref 70–99)
HAV IGM SER QL: 1.7 MG/DL (ref 1.6–2.6)
HCT VFR BLD AUTO: 33.4 % (ref 39–53)
HGB BLD-MCNC: 10.3 G/DL (ref 13–17.5)
IMM GRANULOCYTES # BLD AUTO: 0.01 X10(3) UL (ref 0–1)
IMM GRANULOCYTES NFR BLD: 0.2 %
LYMPHOCYTES # BLD AUTO: 0.94 X10(3) UL (ref 1–4)
LYMPHOCYTES NFR BLD AUTO: 19.3 %
MCH RBC QN AUTO: 28.2 PG (ref 26–34)
MCHC RBC AUTO-ENTMCNC: 30.8 G/DL (ref 31–37)
MCV RBC AUTO: 91.5 FL (ref 80–100)
MONOCYTES # BLD AUTO: 0.77 X10(3) UL (ref 0.1–1)
MONOCYTES NFR BLD AUTO: 15.8 %
NEUTROPHILS # BLD AUTO: 3.05 X10 (3) UL (ref 1.5–7.7)
NEUTROPHILS # BLD AUTO: 3.05 X10(3) UL (ref 1.5–7.7)
NEUTROPHILS NFR BLD AUTO: 62.7 %
OSMOLALITY SERPL CALC.SUM OF ELEC: 288 MOSM/KG (ref 275–295)
PLATELET # BLD AUTO: 151 10(3)UL (ref 150–450)
POTASSIUM SERPL-SCNC: 4.3 MMOL/L (ref 3.5–5.1)
Q-T INTERVAL: 440 MS
QRS DURATION: 184 MS
QTC CALCULATION (BEZET): 611 MS
R AXIS: -80 DEGREES
RBC # BLD AUTO: 3.65 X10(6)UL (ref 3.8–5.8)
SODIUM SERPL-SCNC: 106 MMOL/L
SODIUM SERPL-SCNC: 131 MMOL/L (ref 136–145)
T AXIS: 96 DEGREES
VENTRICULAR RATE: 116 BPM
WBC # BLD AUTO: 4.9 X10(3) UL (ref 4–11)

## 2020-01-19 PROCEDURE — 99232 SBSQ HOSP IP/OBS MODERATE 35: CPT | Performed by: INTERNAL MEDICINE

## 2020-01-19 PROCEDURE — 99233 SBSQ HOSP IP/OBS HIGH 50: CPT | Performed by: NURSE PRACTITIONER

## 2020-01-19 RX ORDER — METOCLOPRAMIDE HYDROCHLORIDE 5 MG/ML
5 INJECTION INTRAMUSCULAR; INTRAVENOUS EVERY 6 HOURS PRN
Status: DISCONTINUED | OUTPATIENT
Start: 2020-01-19 | End: 2020-01-23

## 2020-01-19 RX ORDER — METOCLOPRAMIDE 5 MG/1
5 TABLET ORAL EVERY 6 HOURS PRN
Status: DISCONTINUED | OUTPATIENT
Start: 2020-01-19 | End: 2020-01-23

## 2020-01-19 NOTE — PROGRESS NOTES
[facility]  Nephrology Progress Note    Eric Guerrero Patient Status:  Inpatient    1940 MRN [id number]   [facility] 2NE-A Attending Ashley Alba MD   Hosp Day # 4 PCP Xavier Marie MD       SUBJECTIVE:  Noted to have 2.45* 2.65*   CA 8.5 8.9 9.6 9.6 9.8   MG  --  2.2 2.0 1.8 1.7   * 125* 145* 142* 171*       Recent Labs   Lab 01/15/20  1404   ALT 23   AST 33   ALB 3.2*       Recent Labs   Lab 01/18/20  0647 01/18/20  1147 01/18/20  1657 01/18/20  2124 01/19/20 100 mL infusion, 0.5 mg/hr, Intravenous, Continuous  spironolactone (ALDACTONE) tab 25 mg, 25 mg, Oral, Daily  amiodarone HCl (PACERONE) tab 200 mg, 200 mg, Oral, Daily  milrinone (PRIMACOR) 20mg in D5W 100 mL premix infusion, 0.375 mcg/kg/min, Intravenous

## 2020-01-19 NOTE — PROGRESS NOTES
01/19/20 9375   Clinical Encounter Type   Visited With Health care provider  ( spoke to nurse practioner about the POLST consult for patient. He suggested they look into addressing it with the patient tomorrow.    left the POLST form on

## 2020-01-19 NOTE — PLAN OF CARE
Assumed care of patient around 0730. Patient sitting up in bed, ox4. Denies any chest pain or SOB. Normal sinus on tele. Room air. Bumex drip being held, cardiology APN aware. Milrinone infusing per order. +1 pitting to BLE.  Wound care completed to right l oxygenation  Description  INTERVENTIONS:  - Assess for changes in respiratory status  - Assess for changes in mentation and behavior  - Position to facilitate oxygenation and minimize respiratory effort  - Oxygen supplementation based on oxygen saturation

## 2020-01-19 NOTE — PLAN OF CARE
@7566- no relief of nausea and dry heaving with reglan and zofran  Daily weight- 193 lb, patients dry weight is 199 lb, on call cardiologist called to request pause on Bumex gtt- order confirmed to hold  In evening- [facility] notified of nausea, headache

## 2020-01-19 NOTE — PROGRESS NOTES
[facility]  Cardiology Progress Note    Subjective:  No chest pain or shortness of breath. Had episode of headache and dry heaves last night and subsequently bumex gtt stopped. Feels good. Lying flat without s.o.b. Has lost over 50lbs.     Objectiv to Bumex drip and Milrinone. Net weight loss ~51lbs.   RHC suggested   · ICMP w/ AICD  · Mild to Moderate MR  · Moderate pHTN w/ moderate TR  · CKD Dr. Viridiana Pizano is managing diuretics - creatinine up to 2.65.    · CAD s/p CABG 2015 years and PCI  · AICD '19 tachycardia likely due to Milrinone, which should improve as inotropes weaned. Uptitrate BB as able. No acute intervention required. Ultimately may need to have AV delay lengthened per Dr. Valentine Bowers, which can be done to decrease amount of pacing.   Thomas fo

## 2020-01-19 NOTE — PROGRESS NOTES
[facility] Pharmacy Note:  Renal Dose Adjustment for Metoclopramide (REGLAN)    Olegario Hennessy has been prescribed Metoclopramide (REGLAN) 10 mg every 6 hours as needed for nausea/vomiting. Estimated Creatinine Clearance: 24.8 mL/min (A) (based on SCr of 2.

## 2020-01-19 NOTE — PROGRESS NOTES
[facility] Hospitalist Team  Progress Note      Karli Galindo  7/26/1940    Assessment/Plan:     #LE edema, JARVIS likely 2/2   #Acute on chronic systolic and diastolic HF s/p ICD  -iv diuretics per cards -- was on bumex gtt, stopped  -chest xr with pulm edema 01/19/20  0622    137 135* 134* 131*   K 5.3* 4.8 4.7 4.3 4.3    105 100 95* 91*   CO2 23.0 24.0 29.0 30.0 31.0   BUN 35* 38* 44* 42* 45*   CREATSERUM 2.09* 2.12* 2.39* 2.45* 2.65*   CA 8.5 8.9 9.6 9.6 9.8   MG  --  2.2 2.0 1.8 1.7   * 1

## 2020-01-20 LAB
ANION GAP SERPL CALC-SCNC: 7 MMOL/L (ref 0–18)
BASOPHILS # BLD AUTO: 0.01 X10(3) UL (ref 0–0.2)
BASOPHILS NFR BLD AUTO: 0.2 %
BUN BLD-MCNC: 49 MG/DL (ref 7–18)
BUN/CREAT SERPL: 18.8 (ref 10–20)
CALCIUM BLD-MCNC: 9.1 MG/DL (ref 8.5–10.1)
CHLORIDE SERPL-SCNC: 91 MMOL/L (ref 98–112)
CO2 SERPL-SCNC: 35 MMOL/L (ref 21–32)
CREAT BLD-MCNC: 2.6 MG/DL (ref 0.7–1.3)
DEPRECATED RDW RBC AUTO: 64.3 FL (ref 35.1–46.3)
EOSINOPHIL # BLD AUTO: 0.2 X10(3) UL (ref 0–0.7)
EOSINOPHIL NFR BLD AUTO: 4.3 %
ERYTHROCYTE [DISTWIDTH] IN BLOOD BY AUTOMATED COUNT: 19.5 % (ref 11–15)
GLUCOSE BLD-MCNC: 144 MG/DL (ref 70–99)
GLUCOSE BLD-MCNC: 147 MG/DL (ref 70–99)
GLUCOSE BLD-MCNC: 161 MG/DL (ref 70–99)
GLUCOSE BLD-MCNC: 167 MG/DL (ref 70–99)
GLUCOSE BLD-MCNC: 210 MG/DL (ref 70–99)
HCT VFR BLD AUTO: 31.5 % (ref 39–53)
HGB BLD-MCNC: 10.1 G/DL (ref 13–17.5)
IMM GRANULOCYTES # BLD AUTO: 0.01 X10(3) UL (ref 0–1)
IMM GRANULOCYTES NFR BLD: 0.2 %
LYMPHOCYTES # BLD AUTO: 1.14 X10(3) UL (ref 1–4)
LYMPHOCYTES NFR BLD AUTO: 24.4 %
MCH RBC QN AUTO: 28.9 PG (ref 26–34)
MCHC RBC AUTO-ENTMCNC: 32.1 G/DL (ref 31–37)
MCV RBC AUTO: 90.3 FL (ref 80–100)
MONOCYTES # BLD AUTO: 0.84 X10(3) UL (ref 0.1–1)
MONOCYTES NFR BLD AUTO: 18 %
NEUTROPHILS # BLD AUTO: 2.47 X10 (3) UL (ref 1.5–7.7)
NEUTROPHILS # BLD AUTO: 2.47 X10(3) UL (ref 1.5–7.7)
NEUTROPHILS NFR BLD AUTO: 52.9 %
OSMOLALITY SERPL CALC.SUM OF ELEC: 292 MOSM/KG (ref 275–295)
PLATELET # BLD AUTO: 137 10(3)UL (ref 150–450)
POTASSIUM SERPL-SCNC: 4.1 MMOL/L (ref 3.5–5.1)
RBC # BLD AUTO: 3.49 X10(6)UL (ref 3.8–5.8)
SODIUM SERPL-SCNC: 133 MMOL/L (ref 136–145)
WBC # BLD AUTO: 4.7 X10(3) UL (ref 4–11)

## 2020-01-20 PROCEDURE — 99232 SBSQ HOSP IP/OBS MODERATE 35: CPT | Performed by: INTERNAL MEDICINE

## 2020-01-20 PROCEDURE — 99233 SBSQ HOSP IP/OBS HIGH 50: CPT | Performed by: INTERNAL MEDICINE

## 2020-01-20 RX ORDER — DOCUSATE SODIUM 100 MG/1
100 CAPSULE, LIQUID FILLED ORAL 2 TIMES DAILY
Status: DISCONTINUED | OUTPATIENT
Start: 2020-01-20 | End: 2020-01-23

## 2020-01-20 RX ORDER — SODIUM CHLORIDE 9 MG/ML
INJECTION, SOLUTION INTRAVENOUS
Status: COMPLETED | OUTPATIENT
Start: 2020-01-21 | End: 2020-01-21

## 2020-01-20 RX ORDER — MILRINONE LACTATE 0.2 MG/ML
0.25 INJECTION, SOLUTION INTRAVENOUS CONTINUOUS
Status: DISCONTINUED | OUTPATIENT
Start: 2020-01-20 | End: 2020-01-21

## 2020-01-20 NOTE — PROGRESS NOTES
[facility]  Nephrology Progress Note    Tucker Juarez Patient Status:  Inpatient    1940 MRN [id number]   [facility] 2NE-A Attending Victoriano Carter MD   Hosp Day # 5 PCP Manny Vera MD       SUBJECTIVE:  C/o hunger/th 44* 42* 45* 49*   CREATSERUM 2.12* 2.39* 2.45* 2.65* 2.60*   CA 8.9 9.6 9.6 9.8 9.1   MG 2.2 2.0 1.8 1.7  --    * 145* 142* 171* 147*       Recent Labs   Lab 01/15/20  1404   ALT 23   AST 33   ALB 3.2*       Recent Labs   Lab 01/18/20  2124 01/19/20 Daily  bumetanide (BUMEX) 12.5 mg in sodium chloride 0.9% 100 mL infusion, 0.5 mg/hr, Intravenous, Continuous  amiodarone HCl (PACERONE) tab 200 mg, 200 mg, Oral, Daily  milrinone (PRIMACOR) 20mg in D5W 100 mL premix infusion, 0.375 mcg/kg/min, Intravenous

## 2020-01-20 NOTE — PROGRESS NOTES
INFECTIOUS DISEASE PROGRESS NOTE    Светлана Scales Patient Status:  Inpatient    1940 MRN [id number]   [facility] 2NE-A Attending Reese Castañeda MD   Hosp Day # 5 PCP Maria Del Carmen Stuart MCG/ACT inhaler 2 puff, 2 puff, Inhalation, Q6H PRN  •  Fluticasone Propionate (FLONASE) 50 MCG/ACT nasal spray 1-2 spray, 1-2 spray, Nasal, Daily  •  Clopidogrel Bisulfate (PLAVIX) tab 75 mg, 75 mg, Oral, Daily  •  bumetanide (BUMEX) 12.5 mg in sodium chl --   --    AST 33  --   --   --   --    ALT 23  --   --   --   --    BILT 0.8  --   --   --   --    TP 7.6  --   --   --   --     < > = values in this interval not displayed.        Microbiology    Reviewed in EMR,     Radiology: reviewed     ASSESSMENT:

## 2020-01-20 NOTE — PROGRESS NOTES
[facility]  Cardiology Progress Note    Dakotah Bhatt Patient Status:  Inpatient    1940 MRN [id number]   [facility] 2NE-A Attending Kim Mccollum MD   Hosp Day # 5 PCP Amdaou Cardenas MD     Subjective:  No chest pain, ceFAZolin  1 g Intravenous Q12H   • metoprolol succinate  25 mg Oral Nightly   • Heparin Sodium (Porcine)  5,000 Units Subcutaneous 2 times per day   • Fluticasone Propionate  1-2 spray Nasal Daily   • Clopidogrel Bisulfate  75 mg Oral Daily   • amiodarone reviewed. The patient was admitted from Dr. Jannet [facility] complaining of worsening shortness of breath, worsening fatigue and leg edema. He also had some orthopnea and could not sleep prior to admission. Patient feels much better.   Dyspnea on exer hospital  -Underlying ischemic cardiomyopathy with LVEF of 15-20% -was 20% in 2015  -Coronary artery disease with history of three-vessel CABG July 2015 and history of remote PCI 20 years ago  -Chronic renal disease -mild worsening of kidney function with

## 2020-01-20 NOTE — PROGRESS NOTES
[facility] Hospitalist Team  Progress Note      Dakotah Bhatt  7/26/1940    Assessment/Plan:     #LE edema, JARVIS likely 2/2   #Acute on chronic systolic and diastolic HF s/p ICD  -iv diuretics per cards -- was on bumex gtt, stopped- sig improved in volume stat 01/18/20  0629 01/19/20  0622 01/20/20  0707    135* 134* 131* 133*   K 4.8 4.7 4.3 4.3 4.1    100 95* 91* 91*   CO2 24.0 29.0 30.0 31.0 35.0*   BUN 38* 44* 42* 45* 49*   CREATSERUM 2.12* 2.39* 2.45* 2.65* 2.60*   CA 8.9 9.6 9.6 9.8 9.1   MG 2.

## 2020-01-20 NOTE — PROGRESS NOTES
Problem: Patient/Family Goals  Goal: Patient/Family Long Term Goal  Description  Patient's Long Term Goal: \"able to not have so much fluid on my legs\"    Interventions:  - HF protocol  - See additional Care Plan goals for specific interventions   Outcome limits  Description  INTERVENTIONS:  - Monitor labs and rhythm and assess patient for signs and symptoms of electrolyte imbalances  - Administer electrolyte replacement as ordered  - Monitor response to electrolyte replacements, including rhythm and repeat

## 2020-01-21 LAB
ANION GAP SERPL CALC-SCNC: 5 MMOL/L (ref 0–18)
ANION GAP SERPL CALC-SCNC: 8 MMOL/L (ref 0–18)
ATRIAL RATE: 78 BPM
BUN BLD-MCNC: 48 MG/DL (ref 7–18)
BUN BLD-MCNC: 52 MG/DL (ref 7–18)
BUN/CREAT SERPL: 20.3 (ref 10–20)
BUN/CREAT SERPL: 23.1 (ref 10–20)
CALCIUM BLD-MCNC: 8.7 MG/DL (ref 8.5–10.1)
CALCIUM BLD-MCNC: 9.2 MG/DL (ref 8.5–10.1)
CHLORIDE SERPL-SCNC: 93 MMOL/L (ref 98–112)
CHLORIDE SERPL-SCNC: 93 MMOL/L (ref 98–112)
CO2 SERPL-SCNC: 32 MMOL/L (ref 21–32)
CO2 SERPL-SCNC: 33 MMOL/L (ref 21–32)
CREAT BLD-MCNC: 2.25 MG/DL (ref 0.7–1.3)
CREAT BLD-MCNC: 2.37 MG/DL (ref 0.7–1.3)
DEPRECATED HBV CORE AB SER IA-ACNC: 70.8 NG/ML (ref 30–530)
GLUCOSE BLD-MCNC: 139 MG/DL (ref 70–99)
GLUCOSE BLD-MCNC: 140 MG/DL (ref 70–99)
GLUCOSE BLD-MCNC: 148 MG/DL (ref 70–99)
GLUCOSE BLD-MCNC: 169 MG/DL (ref 70–99)
GLUCOSE BLD-MCNC: 226 MG/DL (ref 70–99)
GLUCOSE BLD-MCNC: 234 MG/DL (ref 70–99)
IRON SATURATION: 10 % (ref 20–50)
IRON SERPL-MCNC: 41 UG/DL (ref 65–175)
OSMOLALITY SERPL CALC.SUM OF ELEC: 288 MOSM/KG (ref 275–295)
OSMOLALITY SERPL CALC.SUM OF ELEC: 296 MOSM/KG (ref 275–295)
POTASSIUM SERPL-SCNC: 4 MMOL/L (ref 3.5–5.1)
POTASSIUM SERPL-SCNC: 4.2 MMOL/L (ref 3.5–5.1)
Q-T INTERVAL: 422 MS
QRS DURATION: 100 MS
QTC CALCULATION (BEZET): 477 MS
R AXIS: 36 DEGREES
SODIUM SERPL-SCNC: 131 MMOL/L (ref 136–145)
SODIUM SERPL-SCNC: 133 MMOL/L (ref 136–145)
T AXIS: 197 DEGREES
TOTAL IRON BINDING CAPACITY: 417 UG/DL (ref 240–450)
TRANSFERRIN SERPL-MCNC: 280 MG/DL (ref 200–360)
VENTRICULAR RATE: 77 BPM

## 2020-01-21 PROCEDURE — 99233 SBSQ HOSP IP/OBS HIGH 50: CPT | Performed by: INTERNAL MEDICINE

## 2020-01-21 RX ORDER — TOLVAPTAN 15 MG/1
15 TABLET ORAL ONCE
Status: COMPLETED | OUTPATIENT
Start: 2020-01-21 | End: 2020-01-21

## 2020-01-21 NOTE — PROGRESS NOTES
[facility]  Nephrology Progress Note    Josué Forman Patient Status:  Inpatient    1940 MRN [id number]   [facility] 2NE-A Attending Pradip Dhillon MD   Hosp Day # 6 PCP Latha Shanks MD       SUBJECTIVE:    No acute ev Inhalation, Q6H PRN  Fluticasone Propionate (FLONASE) 50 MCG/ACT nasal spray 1-2 spray, 1-2 spray, Nasal, Daily  Clopidogrel Bisulfate (PLAVIX) tab 75 mg, 75 mg, Oral, Daily  amiodarone HCl (PACERONE) tab 200 mg, 200 mg, Oral, Daily            Physical Exa improvement in volume status/wt/edema. Tolerate higher creat as needed to maintain vol status. bumex gtt stopped. Creat stable. -  Will await cardiology recs regarding maintenance diuretics -  cardiac cath pending    2.  CHF- noted to have markedly reduc

## 2020-01-21 NOTE — CM/SW NOTE
Care Progression Note:  Active Acute Medical Issue:   Acute on chronic CHF, s/p ICD, EF 15%, plan is for RHC and LHC pending renal function status    Other Contributing Medical Factors/Dx. :   CAD, moderated PAH, CKD, anemia, hyponatremia, LE cellulitis

## 2020-01-21 NOTE — PROGRESS NOTES
[facility]  Cardiology Progress Note    Subjective:  No chest pain or shortness of breath. Tired of being in the hospital.  Discussed his renal function related to cardiac cath dye load.   Will likely plan to hold cath today and reassess renal functio    · CAD s/p CABGx 3 7/2015, remote PCI 16 years ago (cath and op report have been placed in the chart)   · AICD '19  · Possible cellulitis both LE - ID following on Cefazolin  · H/o SVT, stable on Amiodarone - HR have improved off Milrinone.     · Hyponatr BELEN ROBERTSON., OJ., FRAKESH. Medical Director, Heart Failure Research, [address] Director, [address]  [address] [place] [address], [address]  [place]

## 2020-01-21 NOTE — PLAN OF CARE
ALERT AND ORIENTED X4 ON TELE MONITOR HR 70'S SINUS RHYTHM. MILLINONE GTT AT 0.25 MG/KG/MIN AT 8.3 CC/HR IN PROGRESS PATENT AND INTACT. INSTRUCTED NPO AFTER MN FOR RIGHT AND LEFT HEART CATH IN AM AND VERBALIZED UNDERSTANDING.  TYLENOL 2 TABS PO GIVEN FOR HE

## 2020-01-21 NOTE — PROGRESS NOTES
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

## 2020-01-21 NOTE — PLAN OF CARE
NURSING NOTES 6948-3447:  Pt received asleep, arousable, ox4. Room air. SR. NPO after MN for heart cath today. Milrinone drip lower rate to 0.175 mcg/kg/min as ordered at MN. Denies pain. 0530: NS at 20 cc/hr started. R leg dressing changed as ordered.   Favio Gamez

## 2020-01-22 ENCOUNTER — APPOINTMENT (OUTPATIENT)
Dept: INTERVENTIONAL RADIOLOGY/VASCULAR | Facility: HOSPITAL | Age: 80
DRG: 286 | End: 2020-01-22
Attending: INTERNAL MEDICINE
Payer: MEDICARE

## 2020-01-22 LAB
ANION GAP SERPL CALC-SCNC: 8 MMOL/L (ref 0–18)
BUN BLD-MCNC: 47 MG/DL (ref 7–18)
BUN/CREAT SERPL: 21.3 (ref 10–20)
CALCIUM BLD-MCNC: 9.4 MG/DL (ref 8.5–10.1)
CHLORIDE SERPL-SCNC: 99 MMOL/L (ref 98–112)
CO2 SERPL-SCNC: 30 MMOL/L (ref 21–32)
CREAT BLD-MCNC: 2.21 MG/DL (ref 0.7–1.3)
GLUCOSE BLD-MCNC: 126 MG/DL (ref 70–99)
GLUCOSE BLD-MCNC: 130 MG/DL (ref 70–99)
GLUCOSE BLD-MCNC: 143 MG/DL (ref 70–99)
GLUCOSE BLD-MCNC: 180 MG/DL (ref 70–99)
GLUCOSE BLD-MCNC: 250 MG/DL (ref 70–99)
OSMOLALITY SERPL CALC.SUM OF ELEC: 298 MOSM/KG (ref 275–295)
POTASSIUM SERPL-SCNC: 4.5 MMOL/L (ref 3.5–5.1)
SODIUM SERPL-SCNC: 137 MMOL/L (ref 136–145)
SODIUM SERPL-SCNC: 39 MMOL/L

## 2020-01-22 PROCEDURE — 76937 US GUIDE VASCULAR ACCESS: CPT | Performed by: INTERNAL MEDICINE

## 2020-01-22 PROCEDURE — B213YZZ FLUOROSCOPY OF MULTIPLE CORONARY ARTERY BYPASS GRAFTS USING OTHER CONTRAST: ICD-10-PCS | Performed by: INTERNAL MEDICINE

## 2020-01-22 PROCEDURE — 4A023N8 MEASUREMENT OF CARDIAC SAMPLING AND PRESSURE, BILATERAL, PERCUTANEOUS APPROACH: ICD-10-PCS | Performed by: INTERNAL MEDICINE

## 2020-01-22 PROCEDURE — B310YZZ FLUOROSCOPY OF THORACIC AORTA USING OTHER CONTRAST: ICD-10-PCS | Performed by: INTERNAL MEDICINE

## 2020-01-22 PROCEDURE — 93457 R HRT ART/GRFT ANGIO: CPT | Performed by: INTERNAL MEDICINE

## 2020-01-22 PROCEDURE — B211YZZ FLUOROSCOPY OF MULTIPLE CORONARY ARTERIES USING OTHER CONTRAST: ICD-10-PCS | Performed by: INTERNAL MEDICINE

## 2020-01-22 PROCEDURE — 99232 SBSQ HOSP IP/OBS MODERATE 35: CPT | Performed by: INTERNAL MEDICINE

## 2020-01-22 PROCEDURE — 99152 MOD SED SAME PHYS/QHP 5/>YRS: CPT | Performed by: INTERNAL MEDICINE

## 2020-01-22 PROCEDURE — B214YZZ FLUOROSCOPY OF RIGHT HEART USING OTHER CONTRAST: ICD-10-PCS | Performed by: INTERNAL MEDICINE

## 2020-01-22 PROCEDURE — 75710 ARTERY X-RAYS ARM/LEG: CPT | Performed by: INTERNAL MEDICINE

## 2020-01-22 PROCEDURE — B312YZZ FLUOROSCOPY OF LEFT SUBCLAVIAN ARTERY USING OTHER CONTRAST: ICD-10-PCS | Performed by: INTERNAL MEDICINE

## 2020-01-22 RX ORDER — SODIUM CHLORIDE 9 MG/ML
INJECTION, SOLUTION INTRAVENOUS CONTINUOUS
Status: ACTIVE | OUTPATIENT
Start: 2020-01-22 | End: 2020-01-22

## 2020-01-22 RX ORDER — MIDAZOLAM HYDROCHLORIDE 1 MG/ML
INJECTION INTRAMUSCULAR; INTRAVENOUS
Status: COMPLETED
Start: 2020-01-22 | End: 2020-01-22

## 2020-01-22 RX ORDER — ASPIRIN 81 MG/1
81 TABLET ORAL DAILY
Status: DISCONTINUED | OUTPATIENT
Start: 2020-01-23 | End: 2020-01-23

## 2020-01-22 RX ORDER — ACETAMINOPHEN AND CODEINE PHOSPHATE 300; 30 MG/1; MG/1
2 TABLET ORAL EVERY 4 HOURS PRN
Status: DISCONTINUED | OUTPATIENT
Start: 2020-01-22 | End: 2020-01-23

## 2020-01-22 RX ORDER — ACETAMINOPHEN AND CODEINE PHOSPHATE 300; 30 MG/1; MG/1
1 TABLET ORAL EVERY 4 HOURS PRN
Status: DISCONTINUED | OUTPATIENT
Start: 2020-01-22 | End: 2020-01-23

## 2020-01-22 RX ORDER — SODIUM CHLORIDE 9 MG/ML
INJECTION, SOLUTION INTRAVENOUS CONTINUOUS
Status: DISCONTINUED | OUTPATIENT
Start: 2020-01-22 | End: 2020-01-23

## 2020-01-22 RX ORDER — ACETAMINOPHEN 325 MG/1
650 TABLET ORAL EVERY 4 HOURS PRN
Status: DISCONTINUED | OUTPATIENT
Start: 2020-01-22 | End: 2020-01-23

## 2020-01-22 RX ORDER — TOLVAPTAN 15 MG/1
15 TABLET ORAL ONCE
Status: COMPLETED | OUTPATIENT
Start: 2020-01-22 | End: 2020-01-22

## 2020-01-22 RX ORDER — ISOSORBIDE DINITRATE 5 MG/1
5 TABLET ORAL
Status: DISCONTINUED | OUTPATIENT
Start: 2020-01-22 | End: 2020-01-22

## 2020-01-22 RX ORDER — HYDRALAZINE HYDROCHLORIDE 25 MG/1
25 TABLET, FILM COATED ORAL EVERY 8 HOURS SCHEDULED
Status: DISCONTINUED | OUTPATIENT
Start: 2020-01-23 | End: 2020-01-23

## 2020-01-22 RX ORDER — HEPARIN SODIUM 5000 [USP'U]/ML
INJECTION, SOLUTION INTRAVENOUS; SUBCUTANEOUS
Status: COMPLETED
Start: 2020-01-22 | End: 2020-01-22

## 2020-01-22 RX ORDER — ISOSORBIDE DINITRATE 5 MG/1
5 TABLET ORAL EVERY 8 HOURS
Status: DISCONTINUED | OUTPATIENT
Start: 2020-01-23 | End: 2020-01-23

## 2020-01-22 RX ORDER — LIDOCAINE HYDROCHLORIDE 10 MG/ML
INJECTION, SOLUTION EPIDURAL; INFILTRATION; INTRACAUDAL; PERINEURAL
Status: COMPLETED
Start: 2020-01-22 | End: 2020-01-22

## 2020-01-22 NOTE — PLAN OF CARE
ALERT AND ORIENTED X4 ON TELE MONITOR HR 70'S SINUS RHYTHM. INSTRUCTED NPO AFTER MN FOR RIGHT AND LEFT HEART CATH IN AM AND VERBALIZED UNDERSTANDING. TYLENOL 2 TABS PO GIVEN FOR HEADACHE. ALL NEEDS ATTENDED AND WILL CONTINUE TO MONITOR.  CALL LIGHT WITHIN R difficulty  - Respiratory Therapy support as indicated  - Manage/alleviate anxiety  - Monitor for signs/symptoms of CO2 retention  Outcome: Progressing     Problem: METABOLIC/FLUID AND ELECTROLYTES - ADULT  Goal: Electrolytes maintained within normal limit

## 2020-01-22 NOTE — PROCEDURES
[facility]    PATIENT'S NAME: [facility]   ATTENDING PHYSICIAN: Branden Lara M.D. OPERATING PHYSICIAN: Sharee Talbert M.D.    PATIENT ACCOUNT#:   [de-identified]    LOCATION:  [address] [place] [address]  MEDICAL RECORD #:   [id number]       DATE OF BI pulse ox, and heart rate were monitored all the time by the nurse and myself, and the IV line was checked by the nurse and myself.     DESCRIPTION OF PROCEDURE:  After written informed consent was obtained from the patient, he was brought to cardiac cathete graft to ramus intermedius, and we took selective shots of it. The other saphenous vein graft to the left PDA was not found. It was also not found by aortic root shot.     Selective left subclavian artery angiography and LIMA to LAD graft angiography was branches with no significant disease. Left ventriculogram was not performed for kidney safety. Aortic root angiography revealed no aneurysm of thoracic aorta and only trivial aortic valve insufficiency angiographically.   Saphenous vein graft to left segment with some progression since angiogram in 2015, not responsible for patient's ischemic cardiomyopathy.   9.   Right coronary artery was a nondominant vessel with a proximal 100% chronic total occlusion and filling distally through bridging collateral Isordil for chronic heart failure management. 6.   Continue Plavix and add baby aspirin for diffuse coronary artery disease. 7.   Gentle hydration today and check metabolic panel tomorrow morning.   8.   Plan for discharge tomorrow morning if kidneys stab

## 2020-01-22 NOTE — PROGRESS NOTES
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

## 2020-01-22 NOTE — PROGRESS NOTES
[facility]  Nephrology Progress Note    Evelina Lopez Patient Status:  Inpatient    1940 MRN [id number]   [facility] 2NE-A Attending Charly Reyes MD   Hosp Day # 7 PCP Annemarie Escalera MD       SUBJECTIVE:  S/p R/LHC.   t 131* 133* 131* 133* 137   K 4.8 4.7 4.3 4.3 4.1 4.0 4.2 4.5    100 95* 91* 91* 93* 93* 99   CO2 24.0 29.0 30.0 31.0 35.0* 33.0* 32.0 30.0   BUN 38* 44* 42* 45* 49* 52* 48* 47*   CREATSERUM 2.12* 2.39* 2.45* 2.65* 2.60* 2.25* 2.37* 2.21*   CA 8.9 9.6 Oral, Q15 Min PRN    Or  Glucose-Vitamin C (DEX-4) chewable tab 8 tablet, 8 tablet, Oral, Q15 Min PRN  Albuterol Sulfate  (90 Base) MCG/ACT inhaler 2 puff, 2 puff, Inhalation, Q6H PRN  Fluticasone Propionate (FLONASE) 50 MCG/ACT nasal spray 1-2 spra

## 2020-01-22 NOTE — PROCEDURES
Procedure was dictated.     Procedure performed:  -LHC -through the left femoral artery  -Right heart cath with saturations -through the right femoral vein  -Coronary grafts angiography, selective with LIMA graft to LAD and SVG to ramus intermedius  -JACQUELINE monteiro seen.    Selective left subclavian angiography revealed mild disease, calcified in left subclavian artery.     Selective graft angiography:  -LIMA to LAD artery was a widely patent graft supplying LAD artery with no significant disease distally with retrogr and add baby aspirin for diffuse coronary artery disease  -Gentle hydration today and check metabolic panel tomorrow morning  -Plan for discharge tomorrow morning if kidney stable  -Patient needs follow-up with heart failure team  -Patient will follow with

## 2020-01-22 NOTE — PROGRESS NOTES
Received from Cath lab. Alert and ox3 but sleepy. Cordell. groin with dressing-CDI. No bleeding or hematoma. IVF at 75 cc/hr. Tele NSR with hr-90s. Instructed to be bedrest x 2 hrs. Family member on bedside. Will resume diet when fully awake.

## 2020-01-23 VITALS
DIASTOLIC BLOOD PRESSURE: 54 MMHG | WEIGHT: 188.31 LBS | BODY MASS INDEX: 27 KG/M2 | TEMPERATURE: 98 F | HEART RATE: 90 BPM | OXYGEN SATURATION: 99 % | SYSTOLIC BLOOD PRESSURE: 97 MMHG | RESPIRATION RATE: 18 BRPM

## 2020-01-23 LAB
ANION GAP SERPL CALC-SCNC: 7 MMOL/L (ref 0–18)
BASOPHILS # BLD AUTO: 0.02 X10(3) UL (ref 0–0.2)
BASOPHILS NFR BLD AUTO: 0.4 %
BUN BLD-MCNC: 43 MG/DL (ref 7–18)
BUN/CREAT SERPL: 22.4 (ref 10–20)
CALCIUM BLD-MCNC: 9.4 MG/DL (ref 8.5–10.1)
CHLORIDE SERPL-SCNC: 99 MMOL/L (ref 98–112)
CO2 SERPL-SCNC: 29 MMOL/L (ref 21–32)
CREAT BLD-MCNC: 1.92 MG/DL (ref 0.7–1.3)
DEPRECATED RDW RBC AUTO: 64.5 FL (ref 35.1–46.3)
EOSINOPHIL # BLD AUTO: 0.2 X10(3) UL (ref 0–0.7)
EOSINOPHIL NFR BLD AUTO: 3.8 %
ERYTHROCYTE [DISTWIDTH] IN BLOOD BY AUTOMATED COUNT: 19.4 % (ref 11–15)
GLUCOSE BLD-MCNC: 136 MG/DL (ref 70–99)
GLUCOSE BLD-MCNC: 151 MG/DL (ref 70–99)
GLUCOSE BLD-MCNC: 227 MG/DL (ref 70–99)
HCT VFR BLD AUTO: 36.3 % (ref 39–53)
HGB BLD-MCNC: 11.3 G/DL (ref 13–17.5)
IMM GRANULOCYTES # BLD AUTO: 0.02 X10(3) UL (ref 0–1)
IMM GRANULOCYTES NFR BLD: 0.4 %
LYMPHOCYTES # BLD AUTO: 0.99 X10(3) UL (ref 1–4)
LYMPHOCYTES NFR BLD AUTO: 18.8 %
MCH RBC QN AUTO: 28.4 PG (ref 26–34)
MCHC RBC AUTO-ENTMCNC: 31.1 G/DL (ref 31–37)
MCV RBC AUTO: 91.2 FL (ref 80–100)
MONOCYTES # BLD AUTO: 0.75 X10(3) UL (ref 0.1–1)
MONOCYTES NFR BLD AUTO: 14.2 %
NEUTROPHILS # BLD AUTO: 3.3 X10 (3) UL (ref 1.5–7.7)
NEUTROPHILS # BLD AUTO: 3.3 X10(3) UL (ref 1.5–7.7)
NEUTROPHILS NFR BLD AUTO: 62.4 %
OSMOLALITY SERPL CALC.SUM OF ELEC: 293 MOSM/KG (ref 275–295)
PLATELET # BLD AUTO: 149 10(3)UL (ref 150–450)
POTASSIUM SERPL-SCNC: 4 MMOL/L (ref 3.5–5.1)
RBC # BLD AUTO: 3.98 X10(6)UL (ref 3.8–5.8)
SODIUM SERPL-SCNC: 135 MMOL/L (ref 136–145)
WBC # BLD AUTO: 5.3 X10(3) UL (ref 4–11)

## 2020-01-23 PROCEDURE — 99232 SBSQ HOSP IP/OBS MODERATE 35: CPT | Performed by: INTERNAL MEDICINE

## 2020-01-23 PROCEDURE — 99233 SBSQ HOSP IP/OBS HIGH 50: CPT | Performed by: INTERNAL MEDICINE

## 2020-01-23 RX ORDER — HYDRALAZINE HYDROCHLORIDE 25 MG/1
25 TABLET, FILM COATED ORAL EVERY 8 HOURS SCHEDULED
Qty: 90 TABLET | Refills: 3 | Status: SHIPPED | OUTPATIENT
Start: 2020-01-23 | End: 2020-01-23

## 2020-01-23 RX ORDER — CEPHALEXIN 500 MG/1
500 CAPSULE ORAL 2 TIMES DAILY
Qty: 10 CAPSULE | Refills: 0 | Status: SHIPPED
Start: 2020-01-23 | End: 2020-01-28

## 2020-01-23 RX ORDER — METOPROLOL SUCCINATE 25 MG/1
25 TABLET, EXTENDED RELEASE ORAL NIGHTLY
Qty: 30 TABLET | Refills: 3 | Status: SHIPPED | OUTPATIENT
Start: 2020-01-23 | End: 2020-05-20 | Stop reason: DRUGHIGH

## 2020-01-23 RX ORDER — ISOSORBIDE DINITRATE 5 MG/1
5 TABLET ORAL EVERY 8 HOURS
Qty: 90 TABLET | Refills: 3 | Status: SHIPPED | OUTPATIENT
Start: 2020-01-23 | End: 2020-01-23

## 2020-01-23 RX ORDER — ISOSORBIDE DINITRATE 5 MG/1
5 TABLET ORAL EVERY 8 HOURS
Qty: 90 TABLET | Refills: 3 | Status: SHIPPED | OUTPATIENT
Start: 2020-01-23 | End: 2020-03-30

## 2020-01-23 RX ORDER — TORSEMIDE 20 MG/1
20 TABLET ORAL
Status: DISCONTINUED | OUTPATIENT
Start: 2020-01-23 | End: 2020-01-23

## 2020-01-23 RX ORDER — TORSEMIDE 20 MG/1
20 TABLET ORAL
Qty: 60 TABLET | Refills: 3 | Status: SHIPPED | OUTPATIENT
Start: 2020-01-23 | End: 2020-03-30

## 2020-01-23 RX ORDER — METOPROLOL SUCCINATE 25 MG/1
25 TABLET, EXTENDED RELEASE ORAL NIGHTLY
Qty: 30 TABLET | Refills: 3 | Status: SHIPPED | OUTPATIENT
Start: 2020-01-23 | End: 2020-01-23

## 2020-01-23 RX ORDER — HYDRALAZINE HYDROCHLORIDE 25 MG/1
25 TABLET, FILM COATED ORAL EVERY 8 HOURS SCHEDULED
Qty: 90 TABLET | Refills: 3 | Status: SHIPPED | OUTPATIENT
Start: 2020-01-23 | End: 2020-05-20

## 2020-01-23 RX ORDER — ASPIRIN 81 MG/1
81 TABLET ORAL DAILY
Qty: 30 TABLET | Refills: 0 | Status: SHIPPED | COMMUNITY
Start: 2020-01-24

## 2020-01-23 NOTE — PROGRESS NOTES
[facility] Cardiology Progress Note    Subjective:  Feels ready to go home. Huge improvement from admission    Objective:  BP 98/55 (BP Location: Right arm)   Pulse 90   Temp 97.3 °F (36.3 °C) (Oral)   Resp 18   Wt 188 lb 4.8 oz (85.4 kg)   SpO2 99%   BMI 27. Odin Abraham in 1-2 weeks, then Dr. Merritt Keller  · Gated CTA of bypass grafts as outpatient  · No ACE/ARB or entresto  · Continue DAPT  · Low dose hydralazine and isordil  · Will discuss home diuretic dose  · Consider for cardioMEMS sensor implant     Hortencia Chou, a day with spironolactone 3 times a week and follow basic metabolic panel to check in 1 week prior to outpatient visit with nurse practitioner at [address] [place] [address] her office.   I like to make sure his potassium and kidney function are stable on new combination of di

## 2020-01-23 NOTE — PROGRESS NOTES
Assumed care of pt at 1100. Pt had no neuro symptoms. Pt remained in NSR. No complaints of cardiac symptoms. No complaints, signs, or symptoms of respiratory distress. No complaints of abdominal distress.  . Pages sent to ID, nephrology, and cardiology abou

## 2020-01-23 NOTE — DISCHARGE SUMMARY
General Medicine Discharge Summary     Patient ID:  Bart Crawford  [de-identified]year old  7/26/1940    Admit date: 1/15/2020    Discharge date and time: 1/23/2020    Attending Physician: Irish Saleh MD     Pr -echo with worsening EF 15% - now sp R and LHC on 1/22- report pending though I discussed with Cards APN. RH pressures look OK/euvolemic.  One bypass graft I understand is down but others OK and no intervention recommended  -doppler neg for DVT    #CAD  -me PROCEDURE:  US VENOUS DOPPLER LEG BILAT - DIAG IMG (CPT=93970)  COMPARISON:  None.   INDICATIONS:  bilateral edema; likely related to CHF; r/o DVT  TECHNIQUE:  Real time, grey scale, and duplex ultrasound was used to evaluate the lower extremity venous syst CONCLUSION:  Cardiomegaly with pulmonary vascular congestion and mild interstitial thickening throughout the bilateral lungs is concerning for mild congestive failure. Clinical correlation recommended.      Dictated by: Jami Cedeno MD on 1/15/2020 at 2 Fluticasone Propionate 50 MCG/ACT Susp  Commonly known as:  FLONASE  1-2 sprays by Nasal route daily. in each nostril. Point it up and out towards your eye.   Call if you get nose bleeding     Insulin Pen Needle 32G X 4 MM Misc     LANTUS 100 UNIT/ML Soln

## 2020-01-23 NOTE — PROGRESS NOTES
INFECTIOUS DISEASE PROGRESS NOTE    Dwaineantoni Hunter Patient Status:  Inpatient    1940 MRN [id number]   [facility] 2NE-A Attending Chloe Carrasco MD   Hosp Day # 8 PCP Courtney Damon Sodium (Porcine) 5000 UNIT/ML injection 5,000 Units, 5,000 Units, Subcutaneous, 2 times per day  •  glucose (DEX4) oral liquid 15 g, 15 g, Oral, Q15 Min PRN **OR** Glucose-Vitamin C (DEX-4) chewable tab 4 tablet, 4 tablet, Oral, Q15 Min PRN **OR** dextrose 37*   GFRNAA 25* 27* 32*   CA 8.7 9.4 9.4   * 137 135*   K 4.2 4.5 4.0   CL 93* 99 99   CO2 32.0 30.0 29.0       Microbiology    Reviewed in EMR,     Radiology: reviewed     ASSESSMENT:    1.  LLE cellulitis- involving mainly with medial L thigh  - be

## 2020-01-23 NOTE — PROGRESS NOTES
INFECTIOUS DISEASE PROGRESS NOTE    Evelina Lopez Patient Status:  Inpatient    1940 MRN [id number]   [facility] 2NE-A Attending Charly Reyes MD   Hosp Day # 8 PCP Frances Dias magnesium hydroxide (MILK OF MAGNESIA) 400 MG/5ML suspension 30 mL, 30 mL, Oral, Daily PRN  •  Heparin Sodium (Porcine) 5000 UNIT/ML injection 5,000 Units, 5,000 Units, Subcutaneous, 2 times per day  •  glucose (DEX4) oral liquid 15 g, 15 g, Oral, Q15 Min 4.7 5.3   .0 137.0* 149.0*     Recent Labs   Lab 01/21/20 1957 01/22/20  0638 01/23/20  0634   * 126* 136*   BUN 48* 47* 43*   CREATSERUM 2.37* 2.21* 1.92*   GFRAA 29* 31* 37*   GFRNAA 25* 27* 32*   CA 8.7 9.4 9.4   * 137 135*   K 4.2

## 2020-01-23 NOTE — PLAN OF CARE
Problem: Patient/Family Goals  Goal: Patient/Family Long Term Goal  Description  Patient's Long Term Goal: \"able to not have so much fluid on my legs\"    Interventions:  - HF protocol  - See additional Care Plan goals for specific interventions   Outco

## 2020-01-23 NOTE — PLAN OF CARE
Patient aox3, vss,ra, lungs clear, diminished at the bases, NSR. Meds given. Lost over 40 lbs in fluid. AV pacing, NSR. Right leg dressing completed, xeroform, 4x4, ABD, Kerlix and Ace wrap. Skin is wrinked, fragile and dry. Ancef given for cellulitis.  D

## 2020-01-31 ENCOUNTER — OFFICE VISIT (OUTPATIENT)
Dept: CARDIOLOGY | Age: 80
End: 2020-01-31

## 2020-01-31 VITALS
SYSTOLIC BLOOD PRESSURE: 88 MMHG | HEART RATE: 80 BPM | DIASTOLIC BLOOD PRESSURE: 40 MMHG | WEIGHT: 191 LBS | BODY MASS INDEX: 26.74 KG/M2 | HEIGHT: 71 IN

## 2020-01-31 DIAGNOSIS — I50.41 ACUTE COMBINED SYSTOLIC AND DIASTOLIC CHF, NYHA CLASS 4 (CMD): Primary | ICD-10-CM

## 2020-01-31 DIAGNOSIS — I25.10 CORONARY ARTERY DISEASE INVOLVING NATIVE CORONARY ARTERY OF NATIVE HEART WITHOUT ANGINA PECTORIS: ICD-10-CM

## 2020-01-31 DIAGNOSIS — R06.02 SHORTNESS OF BREATH: ICD-10-CM

## 2020-01-31 PROCEDURE — 99215 OFFICE O/P EST HI 40 MIN: CPT | Performed by: NURSE PRACTITIONER

## 2020-01-31 RX ORDER — ISOSORBIDE DINITRATE 5 MG/1
5 TABLET ORAL EVERY 8 HOURS
Refills: 3 | COMMUNITY
Start: 2020-01-24 | End: 2020-03-31 | Stop reason: SDUPTHER

## 2020-01-31 RX ORDER — TORSEMIDE 20 MG/1
20 TABLET ORAL 2 TIMES DAILY
COMMUNITY
Start: 2020-01-23 | End: 2020-03-13 | Stop reason: DRUGHIGH

## 2020-01-31 RX ORDER — GLIMEPIRIDE 2 MG/1
TABLET ORAL
Refills: 3 | COMMUNITY
Start: 2019-11-21

## 2020-01-31 RX ORDER — POTASSIUM CHLORIDE 1500 MG/1
20 TABLET, EXTENDED RELEASE ORAL DAILY
Refills: 3 | COMMUNITY
Start: 2019-12-23 | End: 2020-02-20 | Stop reason: ALTCHOICE

## 2020-01-31 RX ORDER — SPIRONOLACTONE 25 MG/1
25 TABLET ORAL DAILY
Status: SHIPPED | COMMUNITY
Start: 2020-01-31 | End: 2020-02-07 | Stop reason: ALTCHOICE

## 2020-01-31 RX ORDER — RISPERIDONE 0.25 MG/1
0.25 TABLET ORAL DAILY
Refills: 1 | COMMUNITY
Start: 2019-12-29

## 2020-01-31 RX ORDER — HYDRALAZINE HYDROCHLORIDE 25 MG/1
25 TABLET, FILM COATED ORAL EVERY 8 HOURS
Refills: 3 | COMMUNITY
Start: 2020-01-23

## 2020-01-31 RX ORDER — PRAMIPEXOLE DIHYDROCHLORIDE 0.25 MG/1
0.25 TABLET ORAL NIGHTLY
Refills: 1 | COMMUNITY
Start: 2019-10-30

## 2020-01-31 RX ORDER — GABAPENTIN 100 MG/1
CAPSULE ORAL
Refills: 5 | COMMUNITY
Start: 2019-11-11

## 2020-01-31 RX ORDER — ASPIRIN 81 MG/1
81 TABLET ORAL
COMMUNITY
Start: 2020-01-24 | End: 2020-02-20 | Stop reason: ALTCHOICE

## 2020-01-31 ASSESSMENT — PATIENT HEALTH QUESTIONNAIRE - PHQ9
SUM OF ALL RESPONSES TO PHQ9 QUESTIONS 1 AND 2: 0
SUM OF ALL RESPONSES TO PHQ9 QUESTIONS 1 AND 2: 0
2. FEELING DOWN, DEPRESSED OR HOPELESS: NOT AT ALL
1. LITTLE INTEREST OR PLEASURE IN DOING THINGS: NOT AT ALL

## 2020-01-31 ASSESSMENT — ENCOUNTER SYMPTOMS
FEVER: 0
CHILLS: 0
WEIGHT LOSS: 0
SNORING: 0
SUSPICIOUS LESIONS: 0
BRUISES/BLEEDS EASILY: 0
HEMATOCHEZIA: 0
DIZZINESS: 0
LIGHT-HEADEDNESS: 0
ALLERGIC/IMMUNOLOGIC COMMENTS: NO NEW FOOD ALLERGIES
WEIGHT GAIN: 0
HEMOPTYSIS: 0
COUGH: 0

## 2020-02-03 ENCOUNTER — TELEPHONE (OUTPATIENT)
Dept: CARDIOLOGY | Age: 80
End: 2020-02-03

## 2020-02-03 ASSESSMENT — NEW YORK HEART ASSOCIATION (NYHA) CLASSIFICATION: NYHA FUNCTIONAL CLASS: III

## 2020-02-04 ENCOUNTER — TELEPHONE (OUTPATIENT)
Dept: CARDIOLOGY | Age: 80
End: 2020-02-04

## 2020-02-04 ENCOUNTER — HOSPITAL ENCOUNTER (EMERGENCY)
Facility: HOSPITAL | Age: 80
Discharge: HOME | End: 2020-02-04
Attending: EMERGENCY MEDICINE
Payer: MEDICARE

## 2020-02-04 VITALS
HEART RATE: 80 BPM | RESPIRATION RATE: 14 BRPM | WEIGHT: 191.38 LBS | BODY MASS INDEX: 27.4 KG/M2 | SYSTOLIC BLOOD PRESSURE: 115 MMHG | HEIGHT: 70 IN | OXYGEN SATURATION: 96 % | TEMPERATURE: 98 F | DIASTOLIC BLOOD PRESSURE: 79 MMHG

## 2020-02-04 DIAGNOSIS — N28.9 RENAL INSUFFICIENCY: ICD-10-CM

## 2020-02-04 DIAGNOSIS — E87.5 HYPERKALEMIA: Primary | ICD-10-CM

## 2020-02-04 DIAGNOSIS — L03.115 CELLULITIS OF RIGHT FOOT: ICD-10-CM

## 2020-02-04 LAB
ANION GAP SERPL CALC-SCNC: 9 MMOL/L (ref 0–18)
BUN BLD-MCNC: 39 MG/DL (ref 7–18)
BUN/CREAT SERPL: 16.9 (ref 10–20)
CALCIUM BLD-MCNC: 9 MG/DL (ref 8.5–10.1)
CHLORIDE SERPL-SCNC: 99 MMOL/L (ref 98–112)
CO2 SERPL-SCNC: 24 MMOL/L (ref 21–32)
CREAT BLD-MCNC: 2.31 MG/DL (ref 0.7–1.3)
GLUCOSE BLD-MCNC: 177 MG/DL (ref 70–99)
OSMOLALITY SERPL CALC.SUM OF ELEC: 288 MOSM/KG (ref 275–295)
POTASSIUM SERPL-SCNC: 5.3 MMOL/L (ref 3.5–5.1)
SODIUM SERPL-SCNC: 132 MMOL/L (ref 136–145)

## 2020-02-04 PROCEDURE — 99283 EMERGENCY DEPT VISIT LOW MDM: CPT

## 2020-02-04 PROCEDURE — 80048 BASIC METABOLIC PNL TOTAL CA: CPT

## 2020-02-04 PROCEDURE — 80048 BASIC METABOLIC PNL TOTAL CA: CPT | Performed by: EMERGENCY MEDICINE

## 2020-02-04 PROCEDURE — 36415 COLL VENOUS BLD VENIPUNCTURE: CPT

## 2020-02-04 NOTE — ED PROVIDER NOTES
Patient Seen in: [facility] Emergency Department      History   Patient presents with:  Abnormal Labs    Stated Complaint: elevated potassium    HPI    This is a pleasant 26-year-old male coming to the emergency department for abnormal labs.   Janie Pt taken to CT on a bed by RN and PCT with a travel monitor and oxygen tank on 15L of O2. Pt back in room 7086 with stable VS. Will continue to monitor.   [facility] dual chamber ICD implant BSCI.  Dr. Berenice Gross   • [address] N/A 7/8/2015    Performed by Tanesha Louie MD at [address] (EXTERNAL)  12/09/2019    PTA of the chron (*)     BUN 39 (*)     Creatinine 2.31 (*)     GFR, Non- 26 (*)     GFR, -American 30 (*)     All other components within normal limits                MDM       The patient has had improvement of the potassium level renal function is

## 2020-02-04 NOTE — ED INITIAL ASSESSMENT (HPI)
Patient to ED for elevated potassium. Patient was at the immediate care for right leg cellulitis and had labs performed.

## 2020-02-05 ENCOUNTER — LAB REQUISITION (OUTPATIENT)
Dept: LAB | Facility: HOSPITAL | Age: 80
End: 2020-02-05
Payer: MEDICARE

## 2020-02-05 DIAGNOSIS — N18.30 CHRONIC KIDNEY DISEASE, STAGE 3 (MODERATE): ICD-10-CM

## 2020-02-05 LAB
ANION GAP SERPL CALC-SCNC: 9 MMOL/L (ref 0–18)
BUN BLD-MCNC: 44 MG/DL (ref 7–18)
BUN/CREAT SERPL: 19.2 (ref 10–20)
CALCIUM BLD-MCNC: 8.5 MG/DL (ref 8.5–10.1)
CHLORIDE SERPL-SCNC: 101 MMOL/L (ref 98–112)
CO2 SERPL-SCNC: 23 MMOL/L (ref 21–32)
CREAT BLD-MCNC: 2.29 MG/DL (ref 0.7–1.3)
GLUCOSE BLD-MCNC: 192 MG/DL (ref 70–99)
OSMOLALITY SERPL CALC.SUM OF ELEC: 292 MOSM/KG (ref 275–295)
POTASSIUM SERPL-SCNC: 4.9 MMOL/L (ref 3.5–5.1)
SODIUM SERPL-SCNC: 133 MMOL/L (ref 136–145)

## 2020-02-05 PROCEDURE — 80048 BASIC METABOLIC PNL TOTAL CA: CPT | Performed by: INTERNAL MEDICINE

## 2020-02-07 ENCOUNTER — TELEPHONE (OUTPATIENT)
Dept: CARDIOLOGY | Age: 80
End: 2020-02-07

## 2020-02-07 ENCOUNTER — LAB REQUISITION (OUTPATIENT)
Dept: LAB | Age: 80
End: 2020-02-07
Payer: MEDICARE

## 2020-02-07 DIAGNOSIS — N18.30 CHRONIC KIDNEY DISEASE, STAGE 3 (MODERATE): ICD-10-CM

## 2020-02-07 DIAGNOSIS — I13.0 HYPERTENSIVE HEART AND CHRONIC KIDNEY DISEASE WITH HEART FAILURE AND STAGE 1 THROUGH STAGE 4 CHRONIC KIDNEY DISEASE, OR UNSPECIFIED CHRONIC KIDNEY DISEASE (HCC): ICD-10-CM

## 2020-02-07 LAB
ANION GAP SERPL CALC-SCNC: 5 MMOL/L (ref 0–18)
BUN BLD-MCNC: 51 MG/DL (ref 7–18)
BUN/CREAT SERPL: 23.2 (ref 10–20)
CALCIUM BLD-MCNC: 9.2 MG/DL (ref 8.5–10.1)
CHLORIDE SERPL-SCNC: 102 MMOL/L (ref 98–112)
CO2 SERPL-SCNC: 26 MMOL/L (ref 21–32)
CREAT BLD-MCNC: 2.2 MG/DL (ref 0.7–1.3)
GLUCOSE BLD-MCNC: 141 MG/DL (ref 70–99)
OSMOLALITY SERPL CALC.SUM OF ELEC: 292 MOSM/KG (ref 275–295)
POTASSIUM SERPL-SCNC: 4.5 MMOL/L (ref 3.5–5.1)
SODIUM SERPL-SCNC: 133 MMOL/L (ref 136–145)

## 2020-02-07 PROCEDURE — 80048 BASIC METABOLIC PNL TOTAL CA: CPT | Performed by: INTERNAL MEDICINE

## 2020-02-10 ENCOUNTER — TELEPHONE (OUTPATIENT)
Dept: CARDIOLOGY | Age: 80
End: 2020-02-10

## 2020-02-13 ENCOUNTER — APPOINTMENT (OUTPATIENT)
Dept: CARDIOLOGY | Age: 80
End: 2020-02-13

## 2020-02-13 ENCOUNTER — TELEPHONE (OUTPATIENT)
Dept: CARDIOLOGY | Age: 80
End: 2020-02-13

## 2020-02-20 ENCOUNTER — OFFICE VISIT (OUTPATIENT)
Dept: CARDIOLOGY | Age: 80
End: 2020-02-20

## 2020-02-20 ENCOUNTER — HOSPITAL ENCOUNTER (OUTPATIENT)
Dept: LAB | Facility: HOSPITAL | Age: 80
Discharge: HOME | End: 2020-02-20
Attending: NURSE PRACTITIONER
Payer: MEDICARE

## 2020-02-20 VITALS
HEART RATE: 90 BPM | BODY MASS INDEX: 27.9 KG/M2 | WEIGHT: 206 LBS | DIASTOLIC BLOOD PRESSURE: 64 MMHG | SYSTOLIC BLOOD PRESSURE: 116 MMHG | HEIGHT: 72 IN

## 2020-02-20 DIAGNOSIS — R06.02 SHORTNESS OF BREATH: Primary | ICD-10-CM

## 2020-02-20 DIAGNOSIS — E11.22 TYPE 2 DIABETES MELLITUS WITH STAGE 3 CHRONIC KIDNEY DISEASE, WITH LONG-TERM CURRENT USE OF INSULIN (CMD): ICD-10-CM

## 2020-02-20 DIAGNOSIS — N18.30 TYPE 2 DIABETES MELLITUS WITH STAGE 3 CHRONIC KIDNEY DISEASE, WITH LONG-TERM CURRENT USE OF INSULIN (CMD): ICD-10-CM

## 2020-02-20 DIAGNOSIS — I25.10 CORONARY ARTERY DISEASE INVOLVING NATIVE CORONARY ARTERY OF NATIVE HEART WITHOUT ANGINA PECTORIS: ICD-10-CM

## 2020-02-20 DIAGNOSIS — Z79.4 TYPE 2 DIABETES MELLITUS WITH STAGE 3 CHRONIC KIDNEY DISEASE, WITH LONG-TERM CURRENT USE OF INSULIN (CMD): ICD-10-CM

## 2020-02-20 DIAGNOSIS — I50.41 ACUTE COMBINED SYSTOLIC AND DIASTOLIC CHF, NYHA CLASS 4 (CMD): ICD-10-CM

## 2020-02-20 LAB
ANION GAP SERPL CALC-SCNC: 5 MMOL/L (ref 0–18)
BUN BLD-MCNC: 69 MG/DL (ref 7–18)
BUN/CREAT SERPL: 37.7 (ref 10–20)
CALCIUM BLD-MCNC: 9.2 MG/DL (ref 8.5–10.1)
CHLORIDE SERPL-SCNC: 104 MMOL/L (ref 98–112)
CO2 SERPL-SCNC: 26 MMOL/L (ref 21–32)
CREAT BLD-MCNC: 1.83 MG/DL (ref 0.7–1.3)
GLUCOSE BLD-MCNC: 80 MG/DL (ref 70–99)
OSMOLALITY SERPL CALC.SUM OF ELEC: 299 MOSM/KG (ref 275–295)
PATIENT FASTING Y/N/NP: NO
POTASSIUM SERPL-SCNC: 3.5 MMOL/L (ref 3.5–5.1)
SODIUM SERPL-SCNC: 135 MMOL/L (ref 136–145)

## 2020-02-20 PROCEDURE — 80048 BASIC METABOLIC PNL TOTAL CA: CPT

## 2020-02-20 PROCEDURE — 99215 OFFICE O/P EST HI 40 MIN: CPT | Performed by: NURSE PRACTITIONER

## 2020-02-20 PROCEDURE — 36415 COLL VENOUS BLD VENIPUNCTURE: CPT

## 2020-02-20 RX ORDER — METOPROLOL SUCCINATE 50 MG/1
50 TABLET, EXTENDED RELEASE ORAL DAILY
Qty: 30 TABLET | Refills: 3 | Status: SHIPPED | OUTPATIENT
Start: 2020-02-20

## 2020-02-20 SDOH — HEALTH STABILITY: MENTAL HEALTH: HOW OFTEN DO YOU HAVE A DRINK CONTAINING ALCOHOL?: NEVER

## 2020-02-20 SDOH — HEALTH STABILITY: PHYSICAL HEALTH: ON AVERAGE, HOW MANY DAYS PER WEEK DO YOU ENGAGE IN MODERATE TO STRENUOUS EXERCISE (LIKE A BRISK WALK)?: 0 DAYS

## 2020-02-20 SDOH — HEALTH STABILITY: PHYSICAL HEALTH: ON AVERAGE, HOW MANY MINUTES DO YOU ENGAGE IN EXERCISE AT THIS LEVEL?: 0 MIN

## 2020-02-20 ASSESSMENT — ENCOUNTER SYMPTOMS
FEVER: 0
WEIGHT GAIN: 0
COUGH: 0
ALLERGIC/IMMUNOLOGIC COMMENTS: NO NEW FOOD ALLERGIES
WEIGHT LOSS: 0
CHILLS: 0
HEMOPTYSIS: 0
HEMATOCHEZIA: 0
NUMBNESS: 1
BRUISES/BLEEDS EASILY: 0
SUSPICIOUS LESIONS: 0

## 2020-02-20 ASSESSMENT — PATIENT HEALTH QUESTIONNAIRE - PHQ9
SUM OF ALL RESPONSES TO PHQ9 QUESTIONS 1 AND 2: 0
1. LITTLE INTEREST OR PLEASURE IN DOING THINGS: NOT AT ALL
2. FEELING DOWN, DEPRESSED OR HOPELESS: NOT AT ALL
SUM OF ALL RESPONSES TO PHQ9 QUESTIONS 1 AND 2: 0

## 2020-02-28 ENCOUNTER — TELEPHONE (OUTPATIENT)
Dept: CARDIOLOGY | Age: 80
End: 2020-02-28

## 2020-03-01 PROBLEM — R05.9 COUGH: Status: RESOLVED | Noted: 2019-05-29 | Resolved: 2020-03-01

## 2020-03-04 ENCOUNTER — TELEPHONE (OUTPATIENT)
Dept: CARDIOLOGY | Age: 80
End: 2020-03-04

## 2020-03-04 ENCOUNTER — TELEPHONE (OUTPATIENT)
Dept: CARDIOLOGY CLINIC | Facility: CLINIC | Age: 80
End: 2020-03-04

## 2020-03-04 NOTE — TELEPHONE ENCOUNTER
We are receiving his home NXT transmissions. They are showing are high heart logic index since around March 1 today trending up  with today being 43. Do you want your end to follow since you are managing the heart failure?   I spoke with a nurse who state

## 2020-03-09 NOTE — TELEPHONE ENCOUNTER
Spoke with Conrado NORIEGA  Patient following with them at [facility]  Will manage his heart logic   Dr Samira Christian to manage any ICD issues

## 2020-03-11 ENCOUNTER — TELEPHONE (OUTPATIENT)
Dept: CARDIOLOGY | Age: 80
End: 2020-03-11

## 2020-03-11 DIAGNOSIS — R06.02 SHORTNESS OF BREATH: Primary | ICD-10-CM

## 2020-03-11 DIAGNOSIS — I50.41 ACUTE COMBINED SYSTOLIC AND DIASTOLIC CHF, NYHA CLASS 4 (CMD): ICD-10-CM

## 2020-03-11 RX ORDER — TORSEMIDE 20 MG/1
40 TABLET ORAL 2 TIMES DAILY
COMMUNITY
End: 2020-03-30 | Stop reason: SDUPTHER

## 2020-03-13 ENCOUNTER — OFFICE VISIT (OUTPATIENT)
Dept: CARDIOLOGY | Age: 80
End: 2020-03-13

## 2020-03-13 ENCOUNTER — LAB ENCOUNTER (OUTPATIENT)
Dept: LAB | Facility: HOSPITAL | Age: 80
End: 2020-03-13
Attending: INTERNAL MEDICINE
Payer: MEDICARE

## 2020-03-13 VITALS
DIASTOLIC BLOOD PRESSURE: 58 MMHG | HEIGHT: 72 IN | WEIGHT: 224 LBS | HEART RATE: 60 BPM | BODY MASS INDEX: 30.34 KG/M2 | SYSTOLIC BLOOD PRESSURE: 112 MMHG

## 2020-03-13 DIAGNOSIS — I50.41 ACUTE COMBINED SYSTOLIC AND DIASTOLIC HEART FAILURE (HCC): Primary | ICD-10-CM

## 2020-03-13 DIAGNOSIS — I25.10 CORONARY ARTERY DISEASE INVOLVING NATIVE CORONARY ARTERY OF NATIVE HEART WITHOUT ANGINA PECTORIS: ICD-10-CM

## 2020-03-13 DIAGNOSIS — I50.41 ACUTE COMBINED SYSTOLIC AND DIASTOLIC CHF, NYHA CLASS 4 (CMD): Primary | ICD-10-CM

## 2020-03-13 DIAGNOSIS — Z79.4 TYPE 2 DIABETES MELLITUS WITH STAGE 3 CHRONIC KIDNEY DISEASE, WITH LONG-TERM CURRENT USE OF INSULIN (CMD): ICD-10-CM

## 2020-03-13 DIAGNOSIS — E11.22 TYPE 2 DIABETES MELLITUS WITH STAGE 3 CHRONIC KIDNEY DISEASE, WITH LONG-TERM CURRENT USE OF INSULIN (CMD): ICD-10-CM

## 2020-03-13 DIAGNOSIS — N18.30 TYPE 2 DIABETES MELLITUS WITH STAGE 3 CHRONIC KIDNEY DISEASE, WITH LONG-TERM CURRENT USE OF INSULIN (CMD): ICD-10-CM

## 2020-03-13 LAB
ANION GAP SERPL CALC-SCNC: 8 MMOL/L (ref 0–18)
BUN BLD-MCNC: 81 MG/DL (ref 7–18)
BUN/CREAT SERPL: 29.7 (ref 10–20)
CALCIUM BLD-MCNC: 8.6 MG/DL (ref 8.5–10.1)
CHLORIDE SERPL-SCNC: 101 MMOL/L (ref 98–112)
CO2 SERPL-SCNC: 24 MMOL/L (ref 21–32)
CREAT BLD-MCNC: 2.73 MG/DL (ref 0.7–1.3)
GLUCOSE BLD-MCNC: 182 MG/DL (ref 70–99)
NT-PROBNP SERPL-MCNC: 4792 PG/ML (ref ?–450)
OSMOLALITY SERPL CALC.SUM OF ELEC: 305 MOSM/KG (ref 275–295)
PATIENT FASTING Y/N/NP: NO
POTASSIUM SERPL-SCNC: 4.1 MMOL/L (ref 3.5–5.1)
SODIUM SERPL-SCNC: 133 MMOL/L (ref 136–145)

## 2020-03-13 PROCEDURE — 99215 OFFICE O/P EST HI 40 MIN: CPT | Performed by: NURSE PRACTITIONER

## 2020-03-13 PROCEDURE — 83880 ASSAY OF NATRIURETIC PEPTIDE: CPT

## 2020-03-13 PROCEDURE — 80048 BASIC METABOLIC PNL TOTAL CA: CPT

## 2020-03-13 PROCEDURE — 36415 COLL VENOUS BLD VENIPUNCTURE: CPT

## 2020-03-13 RX ORDER — CHOLESTYRAMINE LIGHT 4 G/5.7G
4 POWDER, FOR SUSPENSION ORAL 2 TIMES DAILY WITH MEALS
COMMUNITY

## 2020-03-13 RX ORDER — CEPHALEXIN 500 MG/1
TABLET ORAL 3 TIMES DAILY
COMMUNITY

## 2020-03-13 RX ORDER — VITAMIN B COMPLEX
TABLET ORAL DAILY
COMMUNITY

## 2020-03-13 SDOH — HEALTH STABILITY: MENTAL HEALTH: HOW OFTEN DO YOU HAVE A DRINK CONTAINING ALCOHOL?: NEVER

## 2020-03-13 ASSESSMENT — ENCOUNTER SYMPTOMS
FEVER: 0
COUGH: 0
HEMOPTYSIS: 0
DIZZINESS: 1
SUSPICIOUS LESIONS: 0
WEIGHT LOSS: 0
HEMATOCHEZIA: 0
CHILLS: 0
BRUISES/BLEEDS EASILY: 0
SHORTNESS OF BREATH: 1
ALLERGIC/IMMUNOLOGIC COMMENTS: NO NEW FOOD ALLERGIES
WEIGHT GAIN: 1

## 2020-03-13 ASSESSMENT — PATIENT HEALTH QUESTIONNAIRE - PHQ9
SUM OF ALL RESPONSES TO PHQ9 QUESTIONS 1 AND 2: 0
SUM OF ALL RESPONSES TO PHQ9 QUESTIONS 1 AND 2: 0
2. FEELING DOWN, DEPRESSED OR HOPELESS: NOT AT ALL
1. LITTLE INTEREST OR PLEASURE IN DOING THINGS: NOT AT ALL
2. FEELING DOWN, DEPRESSED OR HOPELESS: NOT AT ALL
1. LITTLE INTEREST OR PLEASURE IN DOING THINGS: NOT AT ALL
SUM OF ALL RESPONSES TO PHQ9 QUESTIONS 1 AND 2: 0

## 2020-03-16 ENCOUNTER — TELEPHONE (OUTPATIENT)
Dept: CARDIOLOGY | Age: 80
End: 2020-03-16

## 2020-03-30 ENCOUNTER — TELEPHONE (OUTPATIENT)
Dept: CARDIOLOGY | Age: 80
End: 2020-03-30

## 2020-03-30 RX ORDER — TORSEMIDE 20 MG/1
40 TABLET ORAL 2 TIMES DAILY
COMMUNITY
End: 2020-05-28

## 2020-03-31 RX ORDER — ISOSORBIDE DINITRATE 5 MG/1
5 TABLET ORAL EVERY 8 HOURS
Qty: 270 TABLET | Refills: 3 | Status: SHIPPED | OUTPATIENT
Start: 2020-03-31 | End: 2020-03-31 | Stop reason: SDUPTHER

## 2020-03-31 RX ORDER — ISOSORBIDE DINITRATE 5 MG/1
5 TABLET ORAL EVERY 8 HOURS
Qty: 270 TABLET | Refills: 3 | Status: SHIPPED | OUTPATIENT
Start: 2020-03-31

## 2020-04-07 ENCOUNTER — ANCILLARY PROCEDURE (OUTPATIENT)
Dept: CARDIOLOGY | Age: 80
End: 2020-04-07
Attending: NURSE PRACTITIONER

## 2020-04-07 ENCOUNTER — APPOINTMENT (OUTPATIENT)
Dept: CARDIOLOGY | Age: 80
End: 2020-04-07

## 2020-04-07 DIAGNOSIS — I25.5 ISCHEMIC CARDIOMYOPATHY: ICD-10-CM

## 2020-05-20 PROBLEM — F51.01 PRIMARY INSOMNIA: Status: ACTIVE | Noted: 2019-09-24

## 2020-05-28 ENCOUNTER — TELEPHONE (OUTPATIENT)
Dept: CARDIOLOGY | Age: 80
End: 2020-05-28

## 2020-05-28 RX ORDER — METOLAZONE 5 MG/1
5 TABLET ORAL
COMMUNITY

## 2020-05-28 RX ORDER — FUROSEMIDE 40 MG/1
40 TABLET ORAL 2 TIMES DAILY
COMMUNITY

## 2020-06-04 ENCOUNTER — ANCILLARY PROCEDURE (OUTPATIENT)
Dept: CARDIOLOGY | Age: 80
End: 2020-06-04
Attending: NURSE PRACTITIONER

## 2020-06-04 DIAGNOSIS — I50.22 CHRONIC SYSTOLIC HEART FAILURE (CMD): ICD-10-CM

## 2020-06-05 ENCOUNTER — APPOINTMENT (OUTPATIENT)
Dept: GENERAL RADIOLOGY | Facility: HOSPITAL | Age: 80
DRG: 291 | End: 2020-06-05
Attending: NURSE PRACTITIONER
Payer: MEDICARE

## 2020-06-05 ENCOUNTER — APPOINTMENT (OUTPATIENT)
Dept: CT IMAGING | Facility: HOSPITAL | Age: 80
DRG: 291 | End: 2020-06-05
Attending: NURSE PRACTITIONER
Payer: MEDICARE

## 2020-06-05 ENCOUNTER — HOSPITAL ENCOUNTER (INPATIENT)
Facility: HOSPITAL | Age: 80
LOS: 4 days | Discharge: HOME HEALTH | DRG: 291 | End: 2020-06-12
Attending: EMERGENCY MEDICINE | Admitting: HOSPITALIST
Payer: MEDICARE

## 2020-06-05 ENCOUNTER — TELEPHONE (OUTPATIENT)
Dept: CARDIOLOGY | Age: 80
End: 2020-06-05
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
## 2020-06-05 DIAGNOSIS — I50.42 CHRONIC COMBINED SYSTOLIC AND DIASTOLIC CONGESTIVE HEART FAILURE (HCC): ICD-10-CM

## 2020-06-05 DIAGNOSIS — E87.70 HYPERVOLEMIA, UNSPECIFIED HYPERVOLEMIA TYPE: Primary | ICD-10-CM
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
## 2020-06-05 DIAGNOSIS — I10 BENIGN ESSENTIAL HYPERTENSION: ICD-10-CM

## 2020-06-05 DIAGNOSIS — L03.119 CELLULITIS OF LOWER EXTREMITY, UNSPECIFIED LATERALITY: ICD-10-CM

## 2020-06-05 DIAGNOSIS — I50.9 ACUTE ON CHRONIC CONGESTIVE HEART FAILURE, UNSPECIFIED HEART FAILURE TYPE (HCC): ICD-10-CM

## 2020-06-05 PROBLEM — R79.89 AZOTEMIA: Status: ACTIVE | Noted: 2020-06-05

## 2020-06-05 PROBLEM — E87.1 HYPONATREMIA: Status: ACTIVE | Noted: 2020-06-05

## 2020-06-05 PROBLEM — I50.21 ACUTE SYSTOLIC (CONGESTIVE) HEART FAILURE (HCC): Status: ACTIVE | Noted: 2020-06-05

## 2020-06-05 PROBLEM — I42.9 CONGESTIVE HEART FAILURE WITH CARDIOMYOPATHY (HCC): Status: ACTIVE | Noted: 2020-06-05

## 2020-06-05 PROBLEM — D69.6 THROMBOCYTOPENIA (HCC): Status: ACTIVE | Noted: 2020-06-05

## 2020-06-05 PROBLEM — N17.9 ACUTE KIDNEY INJURY (HCC): Status: ACTIVE | Noted: 2020-06-05

## 2020-06-05 PROBLEM — R73.9 HYPERGLYCEMIA: Status: ACTIVE | Noted: 2020-06-05

## 2020-06-05 PROBLEM — N17.9 ACUTE RENAL FAILURE (ARF) (HCC): Status: ACTIVE | Noted: 2020-06-05

## 2020-06-05 PROBLEM — D64.9 ANEMIA: Status: ACTIVE | Noted: 2020-06-05

## 2020-06-05 PROCEDURE — 71045 X-RAY EXAM CHEST 1 VIEW: CPT | Performed by: NURSE PRACTITIONER

## 2020-06-05 PROCEDURE — 70450 CT HEAD/BRAIN W/O DYE: CPT | Performed by: NURSE PRACTITIONER

## 2020-06-05 RX ORDER — METOCLOPRAMIDE HYDROCHLORIDE 5 MG/ML
5 INJECTION INTRAMUSCULAR; INTRAVENOUS EVERY 8 HOURS PRN
Status: DISCONTINUED | OUTPATIENT
Start: 2020-06-05 | End: 2020-06-11

## 2020-06-05 RX ORDER — ACETAMINOPHEN 325 MG/1
650 TABLET ORAL EVERY 6 HOURS PRN
Status: DISCONTINUED | OUTPATIENT
Start: 2020-06-05 | End: 2020-06-12

## 2020-06-05 RX ORDER — HEPARIN SODIUM 5000 [USP'U]/ML
5000 INJECTION, SOLUTION INTRAVENOUS; SUBCUTANEOUS EVERY 8 HOURS SCHEDULED
Status: COMPLETED | OUTPATIENT
Start: 2020-06-05 | End: 2020-06-09

## 2020-06-05 RX ORDER — FUROSEMIDE 40 MG/1
40 TABLET ORAL 2 TIMES DAILY
Status: ON HOLD | COMMUNITY
End: 2020-06-12

## 2020-06-05 RX ORDER — FUROSEMIDE 10 MG/ML
40 INJECTION INTRAMUSCULAR; INTRAVENOUS ONCE
Status: COMPLETED | OUTPATIENT
Start: 2020-06-05 | End: 2020-06-05

## 2020-06-05 RX ORDER — ONDANSETRON 2 MG/ML
4 INJECTION INTRAMUSCULAR; INTRAVENOUS EVERY 6 HOURS PRN
Status: DISCONTINUED | OUTPATIENT
Start: 2020-06-05 | End: 2020-06-12

## 2020-06-05 NOTE — ED PROVIDER NOTES
Patient Seen in: [facility] Emergency Department      History   Patient presents with:  Congestive Heart Failure    Stated Complaint: chf    HPI  [de-identified] yo male with history of chf presents with increased weight gain of 10 lbs since 5/27, increased leg to distal SFA, cih was atherectomized , Dilated from 100% down to 0% Dr Bharathi Hernandez [address] [place] [address]  S                      Social History    Tobacco Use      Smoking status: Former Smoker        Packs/day: 2.50        Maribel Garcia Right lower leg: Edema present. Left lower leg: Edema present. Comments: Bilateral lower leg seeping with wounds and some erythema and warmth   Skin:     General: Skin is warm and dry.       Capillary Refill: Capillary refill takes less than 2 sec 78  Rhythm: Sinus Rhythm  Reading: Unusual P axis, possible ectopic atrial rhythm  Low voltage QRS, consider pulmonary disease, pericardial effusion, or normal  variant  Cannot rule out Anterior infarct (cited on or before 20-JAN-2020)  Abnormal ECG  When radiograph was obtained.   COMPARISON:  [facility] , XR, XR CHEST AP PORTABLE  (CPT=71045), 1/15/2020, 8:47 PM.  [facility] , XR, XR CHEST DECUBITUS (CPT=71035), 7/20/2015, 10:40 AM.  INDICATIONS:  chf  PATIENT STATED HISTORY: (As transcribed by Technologist)  Lorri Sandhoff [facility]ocyt[facility] D69.6 6/5/2020 Yes

## 2020-06-05 NOTE — ED INITIAL ASSESSMENT (HPI)
Patient reports water retention about 50lbs, increased SOB especially when walking. Was told by his doctor to come to the ER for CHF exacerbation.

## 2020-06-06 ENCOUNTER — APPOINTMENT (OUTPATIENT)
Dept: NUCLEAR MEDICINE | Facility: HOSPITAL | Age: 80
DRG: 291 | End: 2020-06-06
Attending: INTERNAL MEDICINE
Payer: MEDICARE

## 2020-06-06 ENCOUNTER — APPOINTMENT (OUTPATIENT)
Dept: GENERAL RADIOLOGY | Facility: HOSPITAL | Age: 80
DRG: 291 | End: 2020-06-06
Attending: INTERNAL MEDICINE
Payer: MEDICARE

## 2020-06-06 PROCEDURE — 78580 LUNG PERFUSION IMAGING: CPT | Performed by: INTERNAL MEDICINE

## 2020-06-06 PROCEDURE — 99223 1ST HOSP IP/OBS HIGH 75: CPT | Performed by: INTERNAL MEDICINE

## 2020-06-06 PROCEDURE — 71045 X-RAY EXAM CHEST 1 VIEW: CPT | Performed by: INTERNAL MEDICINE

## 2020-06-06 PROCEDURE — 99222 1ST HOSP IP/OBS MODERATE 55: CPT | Performed by: INTERNAL MEDICINE

## 2020-06-06 RX ORDER — CEFAZOLIN SODIUM/WATER 2 G/20 ML
2 SYRINGE (ML) INTRAVENOUS EVERY 12 HOURS
Status: DISCONTINUED | OUTPATIENT
Start: 2020-06-06 | End: 2020-06-12

## 2020-06-06 RX ORDER — DEXTROSE MONOHYDRATE 25 G/50ML
50 INJECTION, SOLUTION INTRAVENOUS
Status: DISCONTINUED | OUTPATIENT
Start: 2020-06-06 | End: 2020-06-12

## 2020-06-06 RX ORDER — FLUTICASONE PROPIONATE 50 MCG
1-2 SPRAY, SUSPENSION (ML) NASAL DAILY
Status: DISCONTINUED | OUTPATIENT
Start: 2020-06-06 | End: 2020-06-12

## 2020-06-06 RX ORDER — MECLIZINE HCL 12.5 MG/1
25 TABLET ORAL 3 TIMES DAILY PRN
Status: DISCONTINUED | OUTPATIENT
Start: 2020-06-06 | End: 2020-06-12

## 2020-06-06 RX ORDER — HYDRALAZINE HYDROCHLORIDE 25 MG/1
25 TABLET, FILM COATED ORAL EVERY 8 HOURS SCHEDULED
Status: DISCONTINUED | OUTPATIENT
Start: 2020-06-06 | End: 2020-06-12

## 2020-06-06 RX ORDER — AMIODARONE HYDROCHLORIDE 200 MG/1
200 TABLET ORAL DAILY
Status: DISCONTINUED | OUTPATIENT
Start: 2020-06-06 | End: 2020-06-12

## 2020-06-06 RX ORDER — POTASSIUM CHLORIDE 20 MEQ/1
40 TABLET, EXTENDED RELEASE ORAL ONCE
Status: COMPLETED | OUTPATIENT
Start: 2020-06-06 | End: 2020-06-06

## 2020-06-06 RX ORDER — MILRINONE LACTATE 0.2 MG/ML
INJECTION, SOLUTION INTRAVENOUS CONTINUOUS
Status: DISCONTINUED | OUTPATIENT
Start: 2020-06-06 | End: 2020-06-08

## 2020-06-06 RX ORDER — FOLIC ACID/VIT B COMPLEX AND C 400 MCG
1 TABLET ORAL EVERY OTHER DAY
Status: DISCONTINUED | OUTPATIENT
Start: 2020-06-06 | End: 2020-06-12

## 2020-06-06 RX ORDER — TRAZODONE HYDROCHLORIDE 50 MG/1
50 TABLET ORAL NIGHTLY
Status: DISCONTINUED | OUTPATIENT
Start: 2020-06-06 | End: 2020-06-12

## 2020-06-06 RX ORDER — GABAPENTIN 100 MG/1
100 CAPSULE ORAL EVERY EVENING
Status: DISCONTINUED | OUTPATIENT
Start: 2020-06-06 | End: 2020-06-12

## 2020-06-06 RX ORDER — ISOSORBIDE DINITRATE 5 MG/1
5 TABLET ORAL EVERY 8 HOURS
Status: DISCONTINUED | OUTPATIENT
Start: 2020-06-06 | End: 2020-06-12

## 2020-06-06 RX ORDER — POTASSIUM CHLORIDE 20 MEQ/1
20 TABLET, EXTENDED RELEASE ORAL ONCE
Status: DISCONTINUED | OUTPATIENT
Start: 2020-06-06 | End: 2020-06-06

## 2020-06-06 RX ORDER — ASPIRIN 81 MG/1
81 TABLET ORAL DAILY
Status: DISCONTINUED | OUTPATIENT
Start: 2020-06-06 | End: 2020-06-12

## 2020-06-06 RX ORDER — ALBUTEROL SULFATE 90 UG/1
2 AEROSOL, METERED RESPIRATORY (INHALATION) EVERY 4 HOURS PRN
Status: DISCONTINUED | OUTPATIENT
Start: 2020-06-06 | End: 2020-06-12

## 2020-06-06 RX ORDER — METOPROLOL SUCCINATE 50 MG/1
50 TABLET, EXTENDED RELEASE ORAL
Status: DISCONTINUED | OUTPATIENT
Start: 2020-06-06 | End: 2020-06-08

## 2020-06-06 NOTE — PROGRESS NOTES
Pt received from [facility]. IV Bumex infusing at 4cc/hr. Milrinone infusing at 8.3 cc. SOB at rest and exertion. O2 at @ 2L/NC. A&O x4. . NSR on telemetry.  Voids in urinal.

## 2020-06-06 NOTE — PROGRESS NOTES
Pharmacy Note:   [employer] approved dose adjustment for: Ancef    Jaylen Hyman has been prescribed Ancef 1 gram IV every 8 hrs. Estimated Creatinine Clearance: 21.4 mL/min (A) (based on SCr of 2.84 mg/dL (H)). Body mass index is 36.07 kg/m².  Wt Readin

## 2020-06-06 NOTE — H&P
[facility] Hospitalist H&P       CC: Patient presents with:  Congestive Heart Failure       PCP: Annemarie Escalera MD    History of Present Illness:  Ms. Lars Huff is an [de-identified] yo male with PMH of CAD, chronic systolic CHF (05-67%), PVD, HTN, HLD, DM type II who p Comment:vomiting  Novolog [Insulin As*    SWELLING  Victoza                 SWELLING  Atorvastatin            MYALGIA  Ezetimibe               JITTERY    Comment:Other reaction(s):  Other (See Comments)  Furosemide              PAIN    Comment:Other reactio Nasal route daily. in each nostril. Point it up and out towards your eye.   Call if you get nose bleeding, Disp: 16 g, Rfl: 11  B Complex-C-Folic Acid (PX B COMPLEX/VITAMIN C) Oral Tab, Take 1 tablet by mouth every other day.  , Disp: , Rfl:   Cholecalcifer hepatomegaly, +BS  MSK: moving all extremities, ++ edema up to thighs  Neuro: no focal deficits  Skin: + sores and scabs over LE bilaterally, mild erythema  Psych: normal mood/affect          Diagnostic Data:    CBC/Chem  Recent Labs   Lab 06/03/20  1237 0 relatively symmetric in overall degree  SINUSES:  No acute sinusitis. MASTOIDS:  The mastoids are clear. SKULL:  No evidence for fracture or acute osseous abnormality. OTHER:  None. CONCLUSION:  Chronic brain changes as described. No acute bleed.   No 2.2  - Cr 2.99 on admit --> 2.84 this AM, BUN up as well  - likely cardiorenal from volume overload  - monitor I and O and weights  - renally dose meds  - has followed with nephrology previously, will c/s for help with management    # SOB/Hypoxia  - Suspec

## 2020-06-06 NOTE — PROGRESS NOTES
[facility] Pharmacy Note: Antimicrobial Weight Based Dose Adjustment for: ceftriaxone (Navin Meter)    Kerline Patel is a [de-identified]year old male who has been prescribed ceftriaxone (ROCEPHIN) 1 gm every 24 hours.     Estimated Creatinine Clearance: 20.3 mL/min (A) (bas

## 2020-06-06 NOTE — PLAN OF CARE
Assumed care at 0730  Patient alert and oriented x4  NSR on tele. 2L O2  Nephrology consulted  Wound care consulted for lower extremity wounds  Isolation d/c'd  bumex and milrinone drips infusing    Transfer to CTU 8.  Report called to Mendel Murdoch

## 2020-06-06 NOTE — PLAN OF CARE
Assumed care @ 1900. Patient alert oriented x4. On telemetry monitoring. Updated patient with plan of care, verbalizes understanding. Milrinone and bumex started, okay to keep on the floor per md, no titration  Ensures patient safety.   Maintained a ferdinand management of diabetes  Outcome: Progressing  Goal: Electrolytes maintained within normal limits  Description  INTERVENTIONS:  - Monitor labs and rhythm and assess patient for signs and symptoms of electrolyte imbalances  - Administer electrolyte replaceme precautions)  Outcome: Progressing

## 2020-06-06 NOTE — CONSULTS
[facility]    Report of Consultation    Pat Cheek Patient Status:  Observation    1940 MRN [id number]   [facility] 5NW-A Attending Leland Flores MD   Hosp Day # 0 PCP Madina Yu MD     Date of Admission:   to have some coronary disease that was to be treated medically. Patient states he was taking his medicines. In the past there was consideration for CardioMEMS device.   A vein graft to the ramus and a LIMA to the LAD was identified but the saphenous vein Family History   Problem Relation Age of Onset   • Cancer Father    • Heart Disorder Mother    • No Known Problems Son    • Other (Other) Sister    • Cancer Brother    • No Known Problems Son    • Heart Disorder Sister    • Diabetes Sister    • Diabe daily.  amiodarone HCl 200 MG Oral Tab, Take 1 tablet (200 mg total) by mouth daily. Meclizine HCl 25 MG Oral Tab, Take 25 mg by mouth 3 (three) times daily as needed.   ALBUTEROL SULFATE  (90 Base) MCG/ACT Inhalation Aero Soln, INHALE 1-2 PUFFS BY in HPI    Physical Exam:   Blood pressure 116/37, pulse 62, temperature 97.5 °F (36.4 °C), temperature source Oral, resp. rate 17, height 5' 10\" (1.778 m), weight 245 lb (111.1 kg), SpO2 100 %.   Intake/Output:           Last 24 hours:     Intake/Output Mims ESRML 67 (H) 02/03/2020    CRP 3.05 (H) 02/03/2020    MG 3.30 (H) 06/03/2020    PHOS 4.30 06/03/2020    TROP <0.045 06/05/2020     (H) 06/03/2020    B12 269 01/08/2019         Imaging:                 Thank you for allowing me to participate in the

## 2020-06-06 NOTE — CONSULTS
[facility]  Report of Consultation    Robie Riedel Patient Status:  Observation    1940 MRN [id number]   [facility] 8NE-A Attending Jr Rubio MD   Hosp Day # 0 PCP Albesa Dakins, MD     Reason for Consultation: left mid to distal SFA, whcih was atherectomized , Dilated from 100% down to 0% Dr Cordell Fothergill   • TONSILLECTOMY     • TOTAL KNEE REPLACEMENT       Family History   Problem Relation Age of Onset   • Cancer Father    • Heart Disorder Mother    • No Kno spray, Nasal, Daily  •  gabapentin (NEURONTIN) cap 100 mg, 100 mg, Oral, QPM  •  hydrALAzine HCl (APRESOLINE) tab 25 mg, 25 mg, Oral, Q8H DIAMOND  •  isosorbide dinitrate (ISORDIL TITRADOSE) tab 5 mg, 5 mg, Oral, Q8H  •  Meclizine HCl (ANTIVERT) tab 25 mg, 25 sounds. No rebound tenderness   Neurologic: No focal neurological deficits. Musculoskeletal: Full range of motion of all extremities. 2-3+ edema   Integument: No lesions. No erythema. Psychiatric: Appropriate mood and affect.     Laboratory:  Lab Results

## 2020-06-06 NOTE — PROGRESS NOTES
Pt has chills and SOB at rest. RR-30. Temp 97.9 orally. HR 90s. Dr. Sujit Cortés called.  Orders received

## 2020-06-07 PROCEDURE — 99233 SBSQ HOSP IP/OBS HIGH 50: CPT | Performed by: INTERNAL MEDICINE

## 2020-06-07 RX ORDER — POTASSIUM CHLORIDE 20 MEQ/1
40 TABLET, EXTENDED RELEASE ORAL ONCE
Status: COMPLETED | OUTPATIENT
Start: 2020-06-07 | End: 2020-06-07

## 2020-06-07 RX ORDER — ALLOPURINOL 100 MG/1
100 TABLET ORAL DAILY
Status: DISCONTINUED | OUTPATIENT
Start: 2020-06-07 | End: 2020-06-12

## 2020-06-07 NOTE — PLAN OF CARE
Pt alert and oriented x4. On 2L O2 nasal canula. NSR on tele. Denies any SOB or chest pain. Voids in urinal. PT had temp of 99.9 last night, gave tylenol. Rechecked temp came down to 98.0. Uses cane at home to ambulate. Standby assist. QID accucheck.      P lab results as appropriate  - Fluid restriction as ordered  - Instruct patient on fluid and nutrition restrictions as appropriate  Outcome: Progressing  Goal: Hemodynamic stability and optimal renal function maintained  Description  INTERVENTIONS:  - Monit

## 2020-06-07 NOTE — PROGRESS NOTES
[facility]  Progress Note    Tucker Juarez Patient Status:  Observation    1940 MRN [id number]   [facility] 8NE-A Attending Marcelo Grey MD   Hosp Day # 0 PCP Manny Vera MD     No acute events overnight  Feels be 650 mg, Oral, Q6H PRN  ondansetron HCl (ZOFRAN) injection 4 mg, 4 mg, Intravenous, Q6H PRN  Metoclopramide HCl (REGLAN) injection 5 mg, 5 mg, Intravenous, Q8H PRN        Review of Systems:  See HPI; A total of 12 systems reviewed and otherwise unremarkable allopurinol        Thank you for allowing me to participate in this patient's care. Please feel free to call me with any questions or concerns.     Opal Rico MD  6/7/2020

## 2020-06-07 NOTE — PROGRESS NOTES
[facility]  Cardiology Progress Note    Subjective:  Resting quietly. Still with edema but states he can \"breath better\" and is able to now sleep comfortably.     Objective:  /44 (BP Location: Right arm)   Pulse 83   Temp 97.9 °F (36.6 °C) (Or 0.25mcg/kg/min. Net volume losses since admit ~3Liters w/ 3lb weight loss so far. · ICMP w/ BiV AICD - device has heart logic. Interrogate tomorrow.   · CAD w/ hx CABG '15 and PCI LAD '00 - underwent angiography w/ Dr. Jacobo Lick January 2020 which showed 2

## 2020-06-07 NOTE — PLAN OF CARE
Assumed pt care around 0730. Pt denies CP, SOB, dizziness, or lightheadedness. Pt up w/ 1 and walker, continent of b/b. Milrinone and bumex gtt infusing as ordered. Pt education provided on medication and POC. Pt verbalized understanding. All needs met.  Wi range  Description  INTERVENTIONS:  - Monitor Blood Glucose as ordered  - Assess for signs and symptoms of hyperglycemia and hypoglycemia  - Administer ordered medications to maintain glucose within target range  - Assess barriers to adequate nutritional i patient/family  Outcome: Progressing  Goal: Maintain proper alignment of affected body part  Description  INTERVENTIONS:  - Support and protect limb and body alignment per provider's orders  - Instruct and reinforce with patient and family use of appropria

## 2020-06-07 NOTE — PROGRESS NOTES
JUSTIN Hospitalist Progress Note     [facility]      SUBJECTIVE:  Patient short of breath still yesterday afternoon, but feeling better this AM  Low grade temp overnight  No coughing, breathing feeling better this morning    OBJECTIVE:  Temp:  [97 °F ( 6/05/2020, 5:53 PM.  INDICATIONS:  chf  TECHNIQUE:  Noncontrast CT scanning is performed through the brain. Dose reduction techniques were used.  Dose information is transmitted to the ACR ([facility] [employer] [facility]) Simran Mondragon 35 ([address] 6/06/2020 at 8:28 PM          Xr Chest Ap Portable  (cpt=71045)    Result Date: 6/6/2020  PROCEDURE:  XR CHEST AP PORTABLE  (CPT=71045)  TECHNIQUE:  AP chest radiograph was obtained.   COMPARISON:  [facility] , XR, XR CHEST AP PORTABLE  (CPT=71045), 6/05/2020, by: Breanna Dominguez MD on 6/05/2020 at 6:26 PM             Meds:   No current outpatient medications on file.       milrinone (PRIMACOR) 20mg in D5W 100 mL premix infusion, 0.25-0.75 mcg/kg/min, Intravenous, Continuous  bumetanide (BUMEX) 12.5 mg in sodium Intravenous, Q8H PRN       Assessment/Plan:     Ms. Ferne Cheadle is an [de-identified] yo male with PMH of CAD, chronic systolic CHF (02-35%), PVD, HTN, HLD, DM type II who presented to the ED with SOB.       # Acute on Chronic Systolic CHF  - appreciate cardiology recs  - N heparin subQ     Dispo: admit for acute CHF     Outpatient records reviewed confirming patient's medical history and medications.      [facility]  Internal Medicine

## 2020-06-08 ENCOUNTER — APPOINTMENT (OUTPATIENT)
Dept: ULTRASOUND IMAGING | Facility: HOSPITAL | Age: 80
DRG: 291 | End: 2020-06-08
Attending: INTERNAL MEDICINE
Payer: MEDICARE

## 2020-06-08 PROBLEM — I50.9 CHF (CONGESTIVE HEART FAILURE) (HCC): Status: ACTIVE | Noted: 2020-06-08

## 2020-06-08 PROCEDURE — 99233 SBSQ HOSP IP/OBS HIGH 50: CPT | Performed by: INTERNAL MEDICINE

## 2020-06-08 PROCEDURE — 76705 ECHO EXAM OF ABDOMEN: CPT | Performed by: INTERNAL MEDICINE

## 2020-06-08 RX ORDER — POTASSIUM CHLORIDE 20 MEQ/1
40 TABLET, EXTENDED RELEASE ORAL ONCE
Status: COMPLETED | OUTPATIENT
Start: 2020-06-08 | End: 2020-06-08

## 2020-06-08 RX ORDER — DOBUTAMINE HYDROCHLORIDE 200 MG/100ML
2.5 INJECTION INTRAVENOUS CONTINUOUS
Status: DISCONTINUED | OUTPATIENT
Start: 2020-06-08 | End: 2020-06-12

## 2020-06-08 NOTE — PROGRESS NOTES
[facility] Cardiology Progress Note    Subjective:  Feeling a little better. Did not come in earlier as was worried re: covid.      Objective:  BP 98/76   Pulse 87   Temp 97.7 °F (36.5 °C) (Oral)   Resp 18   Ht 177.8 cm (5' 10\")   Wt 247 lb 5.7 oz (112.2 kg) as many of the medical records as I could find. At this time I think the patient has biventricular failure and he appears to have ascites at least by physical exam.  To confirm this I have ordered a abdominal ultrasound.   I have also ordered a urine sodiu

## 2020-06-08 NOTE — PLAN OF CARE
Pt a/ox4 sleepy most of the morning but better this afternoon. Pt with generalized edema abd and legs dressing changed via wound care this am ericka lwr ext .  Tele nsr lungs dim denies sob Bumex gtt maintained and increased per MDO milrinone gtt dc'd and

## 2020-06-08 NOTE — ICD/PM
[address] remote check, reviewed with She device representative. Dual lead ICD, not Bi-V.     Heart Logic reading is 11, which has been increasing steadily since 4/1/20. Baseline reading has been 6 or less. No stored events, no afib.   A

## 2020-06-08 NOTE — PROGRESS NOTES
JUSTIN Hospitalist Progress Note     [facility]      SUBJECTIVE:  No acute events overnight  Didn't sleep well    OBJECTIVE:  Temp:  [97.7 °F (36.5 °C)-98.2 °F (36.8 °C)] 97.7 °F (36.5 °C)  Pulse:  [81-90] 87  Resp:  [18] 18  BP: ()/(44-76) 98/76 Noncontrast CT scanning is performed through the brain. Dose reduction techniques were used. Dose information is transmitted to the [facility] [employer] [facility]) NRDR ([address]) which includes the Dose Index Registry.   PATIENT (cpt=71045)    Result Date: 6/6/2020  PROCEDURE:  XR CHEST AP PORTABLE  (CPT=71045)  TECHNIQUE:  AP chest radiograph was obtained.   COMPARISON:  [facility] , XR, XR CHEST AP PORTABLE  (CPT=71045), 6/05/2020, 5:53 PM.  INDICATIONS:  Short of breath  PATIENT STA Meds:   No current outpatient medications on file.       epoetin nina-epbx (RETACRIT) 49860 UNIT/ML injection 10,000 Units, 10,000 Units, Subcutaneous, Q7 Days  iron sucrose (VENOFER) IV Push 200 mg, 200 mg, Intravenous, Daily  allopurinol (ZYLOPRIM) [facility]  acetaminophen (TYLENOL) tab 650 mg, 650 mg, Oral, Q6H PRN  ondansetron HCl (ZOFRAN) injection 4 mg, 4 mg, Intravenous, Q6H PRN  Metoclopramide HCl (REGLAN) injection 5 mg, 5 mg, Intravenous, Q8H PRN       Assessment/Plan:     Ms. Mathis Concetta is an [de-identified] yo m around 9 --> 8.1 this AM --> Hgb stable at 8.1  - no preston signs of bleeding, monitor  - iron sat low --> IV iron     # Chronic Thrombocytopenia  - Plt over stable  - monitor  - no signs of bleeding     Prophy:  DVT: heparin subQ     Dispo: admit for acute

## 2020-06-08 NOTE — PROGRESS NOTES
[facility]  Nephrology Progress Note    Robie Riedel Patient Status:  Observation    1940 MRN [id number]   [facility] 8NE-A Attending Jr Rubio MD   Hosp Day # 0 PCP Albesa Dakins, MD       SUBJECTIVE:  Up in [facility] 8. 3* 8.3* 8.5 8.5 8.5   MG 3.30*  --   --  2.7* 2.6  --    PHOS 4.30  --   --   --   --   --    GLU 95 183* 189* 148* 180* 192*       Recent Labs   Lab 06/03/20  1237 06/05/20  1724 06/07/20  0540 06/08/20  1030   ALT 13 18 13* 7*   AST 31 36 29 30   ALB 3 Min PRN    Or  glucose (DEX4) oral liquid 30 g, 30 g, Oral, Q15 Min PRN    Or  Glucose-Vitamin C (DEX-4) chewable tab 8 tablet, 8 tablet, Oral, Q15 Min PRN  ceFAZolin sodium (ANCEF/KEFZOL) 2 GM/20ML premix IV syringe 2 g, 2 g, Intravenous, Q12H  Heparin So

## 2020-06-08 NOTE — PHYSICAL THERAPY NOTE
PHYSICAL THERAPY EVALUATION - INPATIENT     Room Number: 4806/3801-W  Evaluation Date: 6/8/2020  Type of Evaluation: Initial  Physician Order: PT Eval and Treat    Presenting Problem: CHF, SVETLANA, LE cellulitis  Reason for Therapy: Mobility Dysfunction History:   Procedure Laterality Date   • ANGIOGRAM  2000   • ANGIOPLASTY (CORONARY)  2000    Stent LAD    • CABG  2015    Triple CABG Dr. Enriquez Balloon     • CATARACT     • CEA Bilateral 2000 2002   • COLONOSCOPY     • COL difficulty does the patient currently have. ..  -   Turning over in bed (including adjusting bedclothes, sheets and blankets)?: A Little   -   Sitting down on and standing up from a chair with arms (e.g., wheelchair, bedside commode, etc.): A Little   -   M In this PT evaluation, the patient presents with the following impairments decreased endurance, balance, strength, safety. Functional outcome measures completed include AMPAC.   Based on this evaluation, patient's clinical presentation is stable and overal

## 2020-06-08 NOTE — PLAN OF CARE
Assumed care at [address]. Pt is A&Ox4, up with 1x a walker. Denies chest pain and SOB. O2 sats WNL on 2L, NC. Diminished lung sounds with some crackles. Pt is NSR on tele, RRR. No cardiac symptoms. BLE edema 1+ pitting, weeping with blisters. Dressings, CDI.  B as ordered  - Initiate emergency measures for life threatening arrhythmias  - Monitor electrolytes and administer replacement therapy as ordered  Outcome: Progressing     Problem: METABOLIC/FLUID AND ELECTROLYTES - ADULT  Goal: Glucose maintained within pr ambulation using safe patient handling equipment as needed  - Ensure adequate protection for wounds/incisions during mobilization  - Obtain PT/OT consults as needed  - Advance activity as appropriate  - Communicate ordered activity level and limitations wi

## 2020-06-09 PROCEDURE — 99233 SBSQ HOSP IP/OBS HIGH 50: CPT | Performed by: INTERNAL MEDICINE

## 2020-06-09 RX ORDER — POLYETHYLENE GLYCOL 3350 17 G/17G
17 POWDER, FOR SOLUTION ORAL DAILY
Status: DISCONTINUED | OUTPATIENT
Start: 2020-06-09 | End: 2020-06-12

## 2020-06-09 RX ORDER — POTASSIUM CHLORIDE 20 MEQ/1
40 TABLET, EXTENDED RELEASE ORAL EVERY 4 HOURS
Status: DISPENSED | OUTPATIENT
Start: 2020-06-09 | End: 2020-06-09

## 2020-06-09 RX ORDER — SENNA AND DOCUSATE SODIUM 50; 8.6 MG/1; MG/1
2 TABLET, FILM COATED ORAL 2 TIMES DAILY
Status: DISCONTINUED | OUTPATIENT
Start: 2020-06-09 | End: 2020-06-12

## 2020-06-09 NOTE — CM/SW NOTE
Spoke with ronda from [place]--patient started on iv dobutamine infusion and will remain on this medication at discharge.   IDRIS centeno to send Hu referral to jasmina.

## 2020-06-09 NOTE — PROGRESS NOTES
JUSTIN Hospitalist Progress Note     [facility]      SUBJECTIVE:  No acute events overnight  Started on dobutamine  Breathing improved    OBJECTIVE:  Temp:  [97.9 °F (36.6 °C)-98.6 °F (37 °C)] 98.2 °F (36.8 °C)  Pulse:  [90-97] 97  Resp:  [16-18] 16  B 06/09/20  1616   PGLU 206* 235* 138* 181* 194*       Imaging:  Ct Brain Or Head (70522)    Result Date: 6/5/2020  PROCEDURE:  CT BRAIN OR HEAD (41606)  COMPARISON:  [facility] , XR, XR CHEST AP PORTABLE  (CPT=71045), 6/05/2020, 5:53 PM.  INDICATIONS:  chf  ERMA the right or left lung on the 8 standard projections. CONCLUSION:  Low probability for acute pulmonary embolism.    Dictated by: Lj Donovan MD on 6/06/2020 at 8:27 PM     Finalized by: Lj Donovan MD on 6/06/2020 at 8:28 PM          Xr Chest Ap Po likely representing improving edema. 2. Stable cardiomegaly. 3. Small left-sided pleural effusion with left basilar atelectasis/infiltrates.     Dictated by: Benedicto Clemente MD on 6/05/2020 at 6:25 PM     Finalized by: Benedicto Clemente MD on 6/05/2020 at Min PRN    Or  glucose (DEX4) oral liquid 30 g, 30 g, Oral, Q15 Min PRN    Or  Glucose-Vitamin C (DEX-4) chewable tab 8 tablet, 8 tablet, Oral, Q15 Min PRN  ceFAZolin sodium (ANCEF/KEFZOL) 2 GM/20ML premix IV syringe 2 g, 2 g, Intravenous, Q12H  Heparin So kadeem ASA  - Continue hydralazine, isordil, metoprolol     # Hx SVT  - Continue home amiodarone  - Tele, sinus rhythm     # DM Type II  - SSC and accuecheck  - insulin nightly -- patient supplying glargine -- 14 units  Nightly  - diabetic diet  - christiano

## 2020-06-09 NOTE — PLAN OF CARE
Intermittent IV diuril with Bumex gtt and Dobutamine gtt. IV Venofer per renal. K replaced. Ancef for LE cellulitis. Drsg change q48. Drsg due 6/10. Patient requiring o2 (see note). Physial therapy saw patient today. Will continue to monitor.   Problem: Wilma effectiveness of antiarrhythmic and heart rate control medications as ordered  - Initiate emergency measures for life threatening arrhythmias  - Monitor electrolytes and administer replacement therapy as ordered  Outcome: Progressing     Problem: METABOLIC ambulating w/ or w/o assistive devices  - Assist with transfers and ambulation using safe patient handling equipment as needed  - Ensure adequate protection for wounds/incisions during mobilization  - Obtain PT/OT consults as needed  - Advance activity as

## 2020-06-09 NOTE — OCCUPATIONAL THERAPY NOTE
OCCUPATIONAL THERAPY EVALUATION - INPATIENT     Room Number: 1762/8722-M  Evaluation Date: 6/9/2020  Type of Evaluation: Initial  Presenting Problem: CHF, falls, hypervolemia, LE cellulitis    Physician Order: IP Consult to Occupational Therapy  Reason for • Peripheral vascular disease ([address]    • Pneumonia due to organism    • Shortness of breath    • Type II or unspecified type diabetes mellitus without mention of complication, not stated as uncontrolled    • Unspecified essential hypertension        Past intact    Communication:  Clear speech    Behavioral/Emotional/Social: pleasant    RANGE OF MOTION AND STRENGTH ASSESSMENT  Upper extremity ROM is within functional limits     Upper extremity strength is within functional limits     COORDINATION  Gross Mot Issued handouts on all of these topics. Completed 2 exercises on the handout with min cueing, 92% room air. Chair alarm on. PPE worn by this OT: surgical mask and gloves.    Patient End of Session: Up in chair;Needs met;Call light within reach;RN aware strengthening/ROM; Patient/Family education;Patient/Family training;Equipment eval/education; Compensatory technique education  Rehab Potential : Good  Frequency (Obs): 3-5x/week  Number of Visits to Meet Established Goals: 5    ADL Goals   Patient will perf

## 2020-06-09 NOTE — DIETARY NOTE
St[address]     Admitting diagnosis:  Fall in home, initial encounter [W19. XXXA, Y92.009]  Cellulitis of lower extremity, unspecified laterality [D93.366]  Hypervolemia, unspecified hypervolemia type [E87.70]

## 2020-06-09 NOTE — PROGRESS NOTES
As reported by Physical therapy o2 sats at rest 89-90 on room air. Once standing from chair sat dipped 84. 2lnc applied sats came back up to 92 and patient ambulated on the 2lnc and sats remained 94.  Will continue to monitor while in hospital and attempt t

## 2020-06-09 NOTE — PLAN OF CARE
Problem: Diabetes/Glucose Control  Goal: Glucose maintained within prescribed range  Description  INTERVENTIONS:  - Monitor Blood Glucose as ordered  - Assess for signs and symptoms of hyperglycemia and hypoglycemia  - Administer ordered medications to m RN  Outcome: Progressing  Goal: Absence of cardiac arrhythmias or at baseline  Description  INTERVENTIONS:  - Continuous cardiac monitoring, monitor vital signs, obtain 12 lead EKG if indicated  - Evaluate effectiveness of antiarrhythmic and heart rate con indicated or ordered  - Monitor response to interventions for patient's volume status, including labs, urine output, blood pressure (other measures as available)  - Encourage oral intake as appropriate  - Instruct patient on fluid and nutrition restriction integrity  Goal: Incisions, wounds, or drain sites healing without s/s of infection  Description  INTERVENTIONS:  - Assess and document skin integrity  - Administer medications as ordered  - Assess and document risk factors for pressure ulcer development

## 2020-06-09 NOTE — PROGRESS NOTES
[facility]  Nephrology Progress Note    Loren Gray Patient Status:  Observation    1940 MRN [id number]   [facility] 8NE-A Attending Jude Juarez MD   Hosp Day # 1 PCP Terell Son MD       SUBJECTIVE:  Improving syringe 2 g, 2 g, Intravenous, Q12H  Heparin Sodium (Porcine) 5000 UNIT/ML injection 5,000 Units, 5,000 Units, Subcutaneous, Q8H DIAMOND  acetaminophen (TYLENOL) tab 650 mg, 650 mg, Oral, Q6H PRN  ondansetron HCl (ZOFRAN) injection 4 mg, 4 mg, Intravenous, Q6H 06/08/20  1030   ALT 13* 7*   AST 29 30   ALB 2.9* 2.8*       Impression/Plan:    1.  SVETLANA/CKD IV- has had progressive CKD due to DM/HTN in setting of cardiorenal physiology. Cr stable   - continue diuretics  - monitor UOP/labs    2.   CHF- EF 15-20%; on ag

## 2020-06-09 NOTE — PROGRESS NOTES
[facility] Cardiology Progress Note    Subjective:   OOB in chair on exam, remains on IV Bumex/Dobutamine gtts. He is feeling better with improved diuresis on Dobs. He currently denies CP, SOB, dizziness,  Palpitations.     Objective:   /49 (BP Lo steadily since 4/1/20. Baseline reading has been 6 or less.      No stored events, no afib. A pacing 35% of time, RV pacing 3% of time.      Lead and battery status in expected range.        Labs:  Lab Results   Component Value Date    WBC 4.9 06/09/2020 25 MG Oral Tab, Take 25 mg by mouth 3 (three) times daily as needed. , Disp: , Rfl:   ALBUTEROL SULFATE  (90 Base) MCG/ACT Inhalation Aero Soln, INHALE 1-2 PUFFS BY MOUTH EVERY 6 HOURS AS NEEDED FOR WHEEZING,SHORTNESS OF BREATH. ALSO FOR COUGH, Disp: 35%, RV pacing 3%, Heart logic 11 (baseline 6 or less)  · Plans to f/u in HF clinic - called and talked to Lesly Lopez in clinic - she will f/u with pt  With appt/education  · Am Labs: CMP, CBC  · CXR in am    Angy Oliver, APRN  6/9/2020  1230  Seen and

## 2020-06-09 NOTE — CM/SW NOTE
Screened pt for potential discharge needs. Noted that pt is current with [facility]. Resumption of care order placed, and notified [facility] liaison via bubble chat of new order. AVS updated. PT is recommending [facility] at discharge as well.      Will continue to assist wi

## 2020-06-10 ENCOUNTER — APPOINTMENT (OUTPATIENT)
Dept: GENERAL RADIOLOGY | Facility: HOSPITAL | Age: 80
DRG: 291 | End: 2020-06-10
Attending: NURSE PRACTITIONER
Payer: MEDICARE

## 2020-06-10 ENCOUNTER — APPOINTMENT (OUTPATIENT)
Dept: INTERVENTIONAL RADIOLOGY/VASCULAR | Facility: HOSPITAL | Age: 80
DRG: 291 | End: 2020-06-10
Attending: NURSE PRACTITIONER
Payer: MEDICARE

## 2020-06-10 PROCEDURE — 99233 SBSQ HOSP IP/OBS HIGH 50: CPT | Performed by: INTERNAL MEDICINE

## 2020-06-10 PROCEDURE — 71045 X-RAY EXAM CHEST 1 VIEW: CPT | Performed by: NURSE PRACTITIONER

## 2020-06-10 PROCEDURE — 02HV33Z INSERTION OF INFUSION DEVICE INTO SUPERIOR VENA CAVA, PERCUTANEOUS APPROACH: ICD-10-PCS | Performed by: RADIOLOGY

## 2020-06-10 RX ORDER — MIDAZOLAM HYDROCHLORIDE 1 MG/ML
INJECTION INTRAMUSCULAR; INTRAVENOUS
Status: COMPLETED
Start: 2020-06-10 | End: 2020-06-10

## 2020-06-10 RX ORDER — LIDOCAINE HYDROCHLORIDE 10 MG/ML
INJECTION, SOLUTION INFILTRATION; PERINEURAL
Status: COMPLETED
Start: 2020-06-10 | End: 2020-06-10

## 2020-06-10 RX ORDER — DOBUTAMINE HYDROCHLORIDE 200 MG/100ML
5 INJECTION INTRAVENOUS CONTINUOUS
Qty: 1 EACH | Refills: 3 | Status: SHIPPED | OUTPATIENT
Start: 2020-06-10 | End: 2020-06-11

## 2020-06-10 NOTE — OCCUPATIONAL THERAPY NOTE
OCCUPATIONAL THERAPY TREATMENT NOTE - INPATIENT     Room Number: 1404 [facility] IVS/ IVS Pool  Session: 1   Number of Visits to Meet Established Goals: 5    Presenting Problem: CHF, falls, hypervolemia, LE cellulitis    History related to current admission: Pt was a II or unspecified type diabetes mellitus without mention of complication, not stated as uncontrolled    • Unspecified essential hypertension        Past Surgical History  Past Surgical History:   Procedure Laterality Date   • ANGIOGRAM  2000   • ANGIOPLAST 37.26  CMS Modifier (G-Code): CK    FUNCTIONAL TRANSFER ASSESSMENT  Supine to Sit : Minimum assistance  Sit to Stand: Contact guard assist    Skilled Therapy Provided: Pleasant. Supine to sit min A. Using 2 liters, 94% room air.   Completed B UE and LE DAVION eval/education; Compensatory technique education  Rehab Potential : Good  Frequency (Obs): 3-5x/week      OT Goals:   PROGRESSING 6/10  ADL Goals   Patient will perform grooming: with supervision and while standing at sink  Patient will perform lower body d

## 2020-06-10 NOTE — PROGRESS NOTES
[facility]  Nephrology Progress Note    Esther Koo Patient Status:  Observation    1940 MRN [id number]   [facility] 8NE-A Attending Melvin Quintanilla MD   Hosp Day # 2 PCP Tete Lopes MD       SUBJECTIVE:  Stable th 3. 4* 3.6 3.3* 3.6   CL 99   < > 100 99 98 97* 97*   CO2 22.3   < > 28.0 30.0 28.0 33.0* 33.0*   BUN 65.0*   < > 67* 73* 70* 73* 73*   CREATSERUM 2.85*   < > 2.70* 2.87* 3.03* 2.94* 3.02*   CA  --    < > 8.5 8.5 8.5 8.4* 8.5   MG 3.30*  --  2.7* 2.6  --  2. (LANTUS) 100 UNIT/ML injection 14 Units, 14 Units, Subcutaneous, Nightly  glucose (DEX4) oral liquid 15 g, 15 g, Oral, Q15 Min PRN    Or  Glucose-Vitamin C (DEX-4) chewable tab 4 tablet, 4 tablet, Oral, Q15 Min PRN    Or  dextrose 50 % injection 50 mL, 50

## 2020-06-10 NOTE — PROGRESS NOTES
[facility] Progress Note    Subjective:   OOB in chair on exam, remains on IV Bumex/Dobutamine gtts. He is feeling better with improved diuresis on Dobs. He currently denies CP, SOB, dizziness,  Palpitations.  Awaiting Venous access cath toda is present. PPM Interrogation: 6/8/20:  [address] remote check, reviewed with She device representative. Dual lead ICD, not Bi-V.      Heart Logic reading is 11, which has been increasing steadily since 4/1/20.   Baseline reading has been 6 Disp: 90 tablet, Rfl: 3  insulin glargine (LANTUS SOLOSTAR) 100 UNIT/ML Subcutaneous Solution Pen-injector, Inject 14 Units into the skin nightly., Disp: 3 pen, Rfl: 5  aspirin 81 MG Oral Tab EC, Take 1 tablet (81 mg total) by mouth daily. , Disp: 30 tablet PCI LAD 2000, cath 1/2020 with 2/3 bypass grafts patent, plan for medical management  · SVETLANA/CKD - Crt 3, nephrology following  · Hx VT on amiodarone  · Hx SVT  · Dm  · Hx bilateral CEA  · Anemia, iron deficient on venofer      Plan:  · Plan for tunneled ve It will be used for dobutamine infusion at home. Dobutamine home was approved based on clinical improvement. Hemoglobin 8.4 from 8.0.   INR 1.4  BUN stable at 73  Creatinine 3.0    Blood pressure is optimal in low 100s over 40s to 50s millimeters of me tomorrow or on Friday.     Discussed with the patient and the Nurses  Mitch Simeon 83  Melany 10, 2020  50-minute patient care with more than half time face-to-face

## 2020-06-10 NOTE — PROGRESS NOTES
[facility] Hospitalist Progress Note     [facility]    Cc: follow up    SUBJECTIVE:  Sitting up in chair, RN at bedside  [place], on 2L NC  Breathing ok  Awaiting placement of IR tunneled catheter    OBJECTIVE:  Temp:  [97.5 °F (36.4 °C)-98.4 °F (36.9 °C)] 98. Recent Labs   Lab 06/09/20  0725 06/09/20  1139 06/09/20  1616 06/09/20  2119 06/10/20  0721   PGLU 138* 181* 194* 212* 158*                 Meds:   LANTUS 100 UNIT/ML Subcutaneous Solution, INJECT 10 UNITS INTO THE SKIN NIGHTLY.  LESS IF BLOOD SUGAR tablet, Oral, Q15 Min PRN  ceFAZolin sodium (ANCEF/KEFZOL) 2 GM/20ML premix IV syringe 2 g, 2 g, Intravenous, Q12H  acetaminophen (TYLENOL) tab 650 mg, 650 mg, Oral, Q6H PRN  ondansetron HCl (ZOFRAN) injection 4 mg, 4 mg, Intravenous, Q6H PRN  Metocloprami RN Sina Estrada    Dispo:     Thank Christopher Colmenares MD    [facility] Hospitalist  Answering Service number: [phone]

## 2020-06-10 NOTE — PLAN OF CARE
Received patient on bed, sleeping, rousable, alert and oriented. Denied chest pain, denied SOB. On Dobutamine drip and Bumex drip as ordered. Discussed POC. Due meds given. Safety measures reinforced, call light within reach. Needs attended to.  Will contin signs and symptoms of hyperglycemia and hypoglycemia  - Administer ordered medications to maintain glucose within target range  - Assess barriers to adequate nutritional intake and initiate nutrition consult as needed  - Instruct patient on self management part  Description  INTERVENTIONS:  - Support and protect limb and body alignment per provider's orders  - Instruct and reinforce with patient and family use of appropriate assistive device and precautions (e.g. spinal or hip dislocation precautions)  Jesika Hart

## 2020-06-10 NOTE — PLAN OF CARE
Patient is alert and oriented x4, [place]. [place] on tele. Oxygenation is 100% on 2 L NC. L sounds diminished. VSS. Patient denies chest pain, shortness of breath, palpitations. Reports generalized pain from arthritis. BLE Nonpitting edema.  BLE leg wound dressings breath.     Interventions:   - - Assess patient  - Monitor vital signs  - Administer medications  - Provide emotional support  - Provide education  - See additional Care Plan goals for specific interventions   6/10/2020 1627 by Kinsey Kan, RN  Outcome: consult as needed  - Instruct patient on self management of diabetes  6/10/2020 1627 by Drea Anaya, RN  Outcome: Progressing  6/10/2020 1126 by Drea Anaya, RN  Outcome: Progressing  Goal: Electrolytes maintained within normal limits  Description  I with patient/family  6/10/2020 1627 by Kelly Mooney RN  Outcome: Progressing  6/10/2020 1126 by Kelly Mooney, RN  Outcome: Progressing  Goal: Maintain proper alignment of affected body part  Description  INTERVENTIONS:  - Support and protect limb and as feeding, grooming, and bathing  - Educate and encourage patient/family in tolerated functional activity level and precautions during self-care     6/10/2020 1627 by Maryellen Ramirez RN  Outcome: Progressing  6/10/2020 1126 by Maryellen Ramirez RN  Outcome:

## 2020-06-10 NOTE — H&P
[place] Ann Marie ArtisBoogie Patient Status:  Inpatient    1940 MRN [id number]   [facility] 8NE-A Attending Chuck Diggs, [facility] Day # 2 PCP Zamzam Fuller MD     Admitting Diagnosis:   Ne Smoker        Packs/day: 2.50        Years: 42.00        Pack years: 105        Types: Cigarettes        Start date: 1956        Quit date: 10/4/1998        Years since quittin.6      Smokeless tobacco: Former User        Types: [address] [facility] 89.3   MCH 27.6 27.2 28.2 28.0   MCHC 30.3* 29.7* 31.1 31.3   RDW 18.1* 18.0* 18.3* 18.4*   NEPRELIM 3.99 3.61 3.39  --    WBC 6.2 5.4 4.9 4.5   PLT 90.0* 85.0* 85.0* 100.0*     Recent Labs   Lab 06/10/20  0459   PTP 17.6*   INR 1.40*     Recent Labs   Lab

## 2020-06-10 NOTE — PROCEDURES
[facility]  Procedure Note    Aurelio Rowe Patient Status:  Inpatient    1940 MRN [id number]   [facility] 8NE-A Attending Angelita Olmstead, *   Hosp Day # 2 PCP Gabriella Al MD     Procedure:  Tunneled central li

## 2020-06-11 PROCEDURE — 99233 SBSQ HOSP IP/OBS HIGH 50: CPT | Performed by: INTERNAL MEDICINE

## 2020-06-11 PROCEDURE — 99232 SBSQ HOSP IP/OBS MODERATE 35: CPT | Performed by: INTERNAL MEDICINE

## 2020-06-11 RX ORDER — DOBUTAMINE HYDROCHLORIDE 200 MG/100ML
4 INJECTION INTRAVENOUS CONTINUOUS
Qty: 1 EACH | Refills: 3 | Status: SHIPPED | OUTPATIENT
Start: 2020-06-11 | End: 2020-06-12

## 2020-06-11 RX ORDER — POTASSIUM CHLORIDE 20 MEQ/1
40 TABLET, EXTENDED RELEASE ORAL ONCE
Status: COMPLETED | OUTPATIENT
Start: 2020-06-11 | End: 2020-06-11

## 2020-06-11 NOTE — PROGRESS NOTES
06/11/20 1500   Mobility   O2 walk?  Yes   SPO2 on Room Air at Rest 93   SPO2 Ambulation on Room Air 88   SPO2 Ambulation on Oxygen 95   Ambulation oxygen flow (liters per minute) 2

## 2020-06-11 NOTE — PROGRESS NOTES
[facility]  Nephrology Progress Note    Sharon Enriquez Patient Status:  Observation    1940 MRN [id number]   [facility] 8NE-A Attending Gonzalez Chaves MD   [facility] Day # 3 PCP Deandre Kilpatrick MD       SUBJECTIVE:  Patient r 06/10/20  0459 06/11/20  0438 06/11/20 0439    135* 135* 136 136  --  135*   K 3.7 3.4* 3.6 3.3* 3.6  --  3.4*    99 98 97* 97*  --  95*   CO2 28.0 30.0 28.0 33.0* 33.0*  --  34.0*   BUN 67* 73* 70* 73* 73*  --  71*   CREATSERUM 2.70* 2.87* 3. (DESYREL) tab 50 mg, 50 mg, Oral, Nightly  insulin glargine (LANTUS) 100 UNIT/ML injection 14 Units, 14 Units, Subcutaneous, Nightly  glucose (DEX4) oral liquid 15 g, 15 g, Oral, Q15 Min PRN    Or  Glucose-Vitamin C (DEX-4) chewable tab 4 tablet, 4 tablet,

## 2020-06-11 NOTE — PLAN OF CARE
Rcv'd A/o/3  [place]   On tele with SR/ST  Heparin  Bumex and Dobutamine gtt  New rt subclavian double lumen port for home Dobutamine   LE wounds wrapped with Karlex  IV antibiotics  Pillow elevating legs  Fall risk  Bed alarm noted    Problem: CARDIOVASCULAR

## 2020-06-11 NOTE — PROGRESS NOTES
[facility] Hospitalist Progress Note     [facility]    Cc: follow up    SUBJECTIVE:  On 1L NC. Yesterday IR tunneled catheter placed. D/w RN, reportedly had streaks of red of massive stool yesterday.   Per pt, no history of hemorrhoids but notes this has --  153* 182*       Recent Labs   Lab 06/05/20  1724 06/07/20  0540 06/08/20  1030 06/10/20  0459   ALT 18 13* 7* <6*   AST 36 29 30 32   ALB 3.2* 2.9* 2.8* 2.8*       Recent Labs   Lab 06/10/20  1233 06/10/20  1706 06/10/20  2101 06/11/20  0556 06/11/20 PRN  traZODone HCl (DESYREL) tab 50 mg, 50 mg, Oral, Nightly  insulin glargine (LANTUS) 100 UNIT/ML injection 14 Units, 14 Units, Subcutaneous, Nightly  glucose (DEX4) oral liquid 15 g, 15 g, Oral, Q15 Min PRN    Or  Glucose-Vitamin C (DEX-4) chewable tab accuecheck  - insulin nightly -- patient supplying glargine -- 14 units  Nightly  - diabetic diet  - patient with reported allergy to aspart  - Accuchecks, hold on SSC with allergy listed     # Acute on Chronic Anemia  - Hgb baseline around 9 --> Hgb overa

## 2020-06-11 NOTE — CM/SW NOTE
Dosing for home dobutamine changed to 4mcg/kg/mon, new script obtained and sent via aidin to jasmina. Plan for d/c tomorrow, new delivery of medication/pump will be coordinate by jasmina.     Current w/[facility], liaison made aware of anticipated d/c tomorrow.     Royal Hays

## 2020-06-12 VITALS
OXYGEN SATURATION: 96 % | HEIGHT: 70 IN | BODY MASS INDEX: 34.18 KG/M2 | WEIGHT: 238.75 LBS | DIASTOLIC BLOOD PRESSURE: 52 MMHG | TEMPERATURE: 98 F | HEART RATE: 96 BPM | RESPIRATION RATE: 20 BRPM | SYSTOLIC BLOOD PRESSURE: 111 MMHG

## 2020-06-12 PROCEDURE — 99232 SBSQ HOSP IP/OBS MODERATE 35: CPT | Performed by: INTERNAL MEDICINE

## 2020-06-12 PROCEDURE — 99233 SBSQ HOSP IP/OBS HIGH 50: CPT | Performed by: INTERNAL MEDICINE

## 2020-06-12 PROCEDURE — 99232 SBSQ HOSP IP/OBS MODERATE 35: CPT | Performed by: HOSPITALIST

## 2020-06-12 RX ORDER — METOPROLOL SUCCINATE 25 MG/1
12.5 TABLET, EXTENDED RELEASE ORAL DAILY
Qty: 30 TABLET | Refills: 5 | Status: ON HOLD | OUTPATIENT
Start: 2020-06-12 | End: 2020-07-06

## 2020-06-12 RX ORDER — AMIODARONE HYDROCHLORIDE 200 MG/1
200 TABLET ORAL ONCE
Status: COMPLETED | OUTPATIENT
Start: 2020-06-12 | End: 2020-06-12

## 2020-06-12 RX ORDER — HEPARIN SODIUM 5000 [USP'U]/ML
5000 INJECTION, SOLUTION INTRAVENOUS; SUBCUTANEOUS EVERY 12 HOURS SCHEDULED
Status: DISCONTINUED | OUTPATIENT
Start: 2020-06-12 | End: 2020-06-12

## 2020-06-12 RX ORDER — CEPHALEXIN 250 MG/1
500 CAPSULE ORAL 2 TIMES DAILY
Qty: 12 CAPSULE | Refills: 0 | Status: SHIPPED | OUTPATIENT
Start: 2020-06-12 | End: 2020-06-15

## 2020-06-12 RX ORDER — DOBUTAMINE HYDROCHLORIDE 200 MG/100ML
2.5 INJECTION INTRAVENOUS CONTINUOUS
Qty: 1 EACH | Refills: 3 | Status: SHIPPED | OUTPATIENT
Start: 2020-06-12 | End: 2020-06-12

## 2020-06-12 RX ORDER — ALLOPURINOL 100 MG/1
100 TABLET ORAL DAILY
Qty: 30 TABLET | Refills: 0 | Status: SHIPPED | OUTPATIENT
Start: 2020-06-13

## 2020-06-12 NOTE — CM/SW NOTE
06/12/20 1500   Discharge disposition   Expected discharge disposition Home-Health   Name of Facillity/Home Care/Hospice Residential   [facility] provider [address]   Discharge transportation Private car     Pt has been cleared to dc without any plans for in

## 2020-06-12 NOTE — CM/SW NOTE
[facility] notified sw that they do not supply the 10 ml sterile ns syringes. SW spoke to [facility] they have then in stock but these need a script- [place] code for these :  [id number]. [facility] states [address] [facility] carries these.   SW spoke to pharmacy - they can sen

## 2020-06-12 NOTE — PROGRESS NOTES
· [facility]      Subjective:  No dyspnea or pain. Had sustained VT earlier. Wants to go home    Objective:  SR with VT short bursts and sustained episodes.   AFebrile  100/39  BUN/cr 77/3.21 Na 135 K 3.7    Neuro: awake/alert  HEENT:    Card depending on above.     · Home Dobutamine is set up through [facility]  · He will f/u Dr. Geoff Murray as outpatient - I see appointment in HF Clinic next week    Nadege Beard NP    Addendum:  He is having continued VT with palpitations - he is wanting Do are clear. Regular heartbeats with 2 out of 6 systolic murmur and mildly elevated jugular venous pressure. Mild leg edema. Lower legs wrapped.   Cellulitis improving.     Impression:  -End-stage heart failure with acute on chronic heart failure exacerbat no higher doses than metoprolol succinate 12.5 mg p.o. daily at present time. He will not tolerate higher doses with his end-stage heart failure. Which is been a suppressed ventricular ectopy besides amiodarone.      Lower amiodarone down to 200 mg p.o. d

## 2020-06-12 NOTE — PROGRESS NOTES
[facility] Hospitalist Progress Note     [facility]    Cc: follow up    SUBJECTIVE:  Earlier this AM, called by RN that pt had RRT for SVT for a minute or so. Pt asymptomatic. When seen, HR back to 90s.   When seen, pt and RNs at bedside- pt eager for d 06/12/20  0823   PGLU 202* 296* 257* 104* 130*                 Meds:   DOBUTamine in D5W 500 mg/250 ml Intravenous Solution, Inject 292 mcg/min into the vein continuous.  Please check weekly BMP, results to Dr Jaden Carcamo ([phone]  LANTUS 100 UNIT/ML Sub mL, 50 mL, Intravenous, Q15 Min PRN    Or  glucose (DEX4) oral liquid 30 g, 30 g, Oral, Q15 Min PRN    Or  Glucose-Vitamin C (DEX-4) chewable tab 8 tablet, 8 tablet, Oral, Q15 Min PRN  ceFAZolin sodium (ANCEF/KEFZOL) 2 GM/20ML premix IV syringe 2 g, 2 g, I low --> IV iron  - occult stool +: suspect rectal tearing d/t constipation (pt reports had this before)- monitor closely. Hb stable in past several days and no LH/dizziness.  Today's Hb pending    # Chronic Thrombocytopenia  - Plt over stable  - monitor

## 2020-06-12 NOTE — PROGRESS NOTES
[facility]PITAL[facility]  RAPID RESPONSE NOTE     Esther Koo Patient Status:  Inpatient    1940 MRN [id number]   [facility] 8NE-A Attending Taiwo Thorpe, *   Hosp Day # 4 PCP Tete Lopes MD     Reason for RRT:v-tach

## 2020-06-12 NOTE — PLAN OF CARE
Assumed care at [address]. Pt is A&Ox4, up with 1x walker. Denies chest pain and SOB. O2 sats WNL on RA. Needs 2L with sleep and ambulation. Lung sounds diminished bilaterally. Pt is NSR on tele, RRR. No cardiac symptoms. Bowel sounds active x4, last BM 6/11.  B Evaluate effectiveness of vasoactive medications to optimize hemodynamic stability  - Monitor arterial and/or venous puncture sites for bleeding and/or hematoma  - Assess quality of pulses, skin color and temperature  - Assess for signs of decreased corona specific gravity, serum osmolarity and serum sodium as indicated or ordered  - Monitor response to interventions for patient's volume status, including labs, urine output, blood pressure (other measures as available)  - Encourage oral intake as appropriate tissue  - Implement wound care per orders  - Initiate self-injury protocol  - Implement isolation precautions as appropriate  - Initiate Pressure Ulcer prevention bundle as indicated  Outcome: Progressing     Problem: Impaired Activities of Daily Living  G

## 2020-06-12 NOTE — CM/SW NOTE
Spoke with sp morales, pt will not be on iv dobutamine or milrinone infusions @ discharge today--updated ronda with j[facility] and cong with [facility]

## 2020-06-12 NOTE — PROGRESS NOTES
[facility]  Nephrology Progress Note    Jj Silver Patient Status:  Observation    1940 MRN [id number]   [facility] 8NE-A Attending Kirsten Montoya MD   [facility] Day # 4 PCP Radha Conde MD       SUBJECTIVE:  Having ru 06/09/20  0449 06/10/20  0459 06/11/20  0438 06/11/20  0439 06/11/20  0836 06/12/20  0453    136  --  135* 134* 135*   K 3.3* 3.6  --  3.4* 3.7 3.7   CL 97* 97*  --  95* 93* 94*   CO2 33.0* 33.0*  --  34.0* 33.0* 34.0*   BUN 73* 73*  --  71* 76* 77* PRN  traZODone HCl (DESYREL) tab 50 mg, 50 mg, Oral, Nightly  insulin glargine (LANTUS) 100 UNIT/ML injection 14 Units, 14 Units, Subcutaneous, Nightly  glucose (DEX4) oral liquid 15 g, 15 g, Oral, Q15 Min PRN    Or  Glucose-Vitamin C (DEX-4) chewable tab

## 2020-06-12 NOTE — PHYSICAL THERAPY NOTE
PHYSICAL THERAPY TREATMENT NOTE - INPATIENT    Room Number: 4655/7331-J     Session: 1   Number of Visits to Meet Established Goals: 4    Presenting Problem: CHF, SVETLANA, LE cellulitis, falls    Problem List  Principal Problem:    Hypervolemia, unspecified h • COLONOSCOPY     • COLONOSCOPY     • EP DUAL CHAMBER ICD  01/16/2019    [address] [facility] dual chamber ICD implant BSCI.  Dr. Chandan Newberry   • HEART CORONARY ARTERY BYPASS GRAFT N/A 7/8/2015    Performed by Micaela Rios MD at [place] [address] assist  Distance (ft): 120  Assistive Device: Rolling walker  Pattern: Shuffle  Stoop/Curb Assistance: Not tested       Skilled Therapy Provided: The pt needed min assist for standing, cues for scooting to edge, proper hand placement and for proper breathi supervision      Goal #4     Goal #5     Goal #6     Goal Comments: Goals established on 6/8/2020

## 2020-06-12 NOTE — PLAN OF CARE
Ki Galvan 79 interrogated  During Rapid response at [address] T. HARI made aware, Dr Barbara Anderson at bs.

## 2020-06-12 NOTE — PLAN OF CARE
Alert & oriented x4. SR/ST on tele. HR 80's following decrease of dobs gtt to 4mcg/kg/min. (was previously 5mcg/kg;). 2-3 runs of NSVT. Asymptomatic. Electrolytes WNL. Cards made aware. No noted VT following decrease of dose of dobs to 4mcg.  Denies chest p Progressing     Problem: CARDIOVASCULAR - ADULT  Goal: Maintains optimal cardiac output and hemodynamic stability  Description  INTERVENTIONS:  - Monitor vital signs, rhythm, and trends  - Monitor for bleeding, hypotension and signs of decreased cardiac ou appropriate  Outcome: Progressing  Goal: Hemodynamic stability and optimal renal function maintained  Description  INTERVENTIONS:  - Monitor labs and assess for signs and symptoms of volume excess or deficit  - Monitor intake, output and patient weight  - independence in self care  Description  Interventions:  - Assess ability and encourage patient to participate in ADLs to maximize function  - Promote sitting position while performing ADLs such as feeding, grooming, and bathing  - Educate and encourage pat

## 2020-06-12 NOTE — PLAN OF CARE
3980- Patient had Vergia Bue that sustained for a minute and a half. Patient states, \"I feel my heart racing. \" Patient was up in the chair at the time. RRT called, patient brought back to bed. This RN stopped the dobutamine and bumex drips.  Patient converted

## 2020-06-12 NOTE — CM/SW NOTE
Sw received order for  Home O2- order sent to [facility] via [address] O2 walk was done yesterday- depending on dc date, may need to repeat O2 walk. Plan had been for pt to dc on dobutamine through [facility].  Pt having VT so cards deciding on plan,

## 2020-06-12 NOTE — PLAN OF CARE
Problem: Diabetes/Glucose Control  Goal: Glucose maintained within prescribed range  Description  INTERVENTIONS:  - Monitor Blood Glucose as ordered  - Assess for signs and symptoms of hyperglycemia and hypoglycemia  - Administer ordered medications to [facility] Evaluate effectiveness of antiarrhythmic and heart rate control medications as ordered  - Initiate emergency measures for life threatening arrhythmias  - Monitor electrolytes and administer replacement therapy as ordered  Outcome: Progressing     Problem: transferring and ambulating w/ or w/o assistive devices  - Assist with transfers and ambulation using safe patient handling equipment as needed  - Ensure adequate protection for wounds/incisions during mobilization  - Obtain PT/OT consults as needed  - Adv encourage patient/family in tolerated functional activity level and precautions during self-care     Outcome: Progressing

## 2020-06-13 NOTE — DISCHARGE SUMMARY
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 These are patchy throughout the cerebrum bilaterally, relatively symmetric in overall degree  SINUSES:  No acute sinusitis. MASTOIDS:  The mastoids are clear.   SKULL:  No evidence 6/06/2020, 6:14 PM.  INDICATIONS:  SOB  PATIENT STATED HISTORY: (As transcribed by Technologist)     FINDINGS:  Cardiomediastinal silhouette stable with sternotomy wires and left-sided pacemaker device.   Shallow inspiratory film with increasing interstitia by Technologist)  Patient reports water retention about 50lbs, increased SOB especially when walking. Was told by his doctor to come to the ER for CHF exacerbation. FINDINGS:  Stable cardiomegaly. Stable left-sided AICD.   Small left-sided pleural effu sheath. The catheter was then placed to the peel-away sheath and the sheath was removed. The catheter was cut to 21 cm in length from tip to cuff. The catheter was flushed.  The catheter was secured to the skin with nonabsorbable suture and a sterile dress capsule, R-1    hydrALAzine HCl 25 MG Oral Tab  Take 1 tablet (25 mg total) by mouth every 8 (eight) hours. , Normal, Disp-270 tablet, R-3    traZODone HCl 50 MG Oral Tab  Take 1 tablet (50 mg total) by mouth nightly., Normal, Disp-90 tablet, R-3    isosorb daily., Mail Order Print, Disp-1 kit, R-0    !! Glucose Blood (TRUE METRIX BLOOD GLUCOSE TEST) In Vitro Strip  1 strip by In Vitro route 5 (five) times daily. , Mail Order Print, Disp-100 strip, R-11Patient tests five times daily given type 2 insulin requir [facility] 94   [phone]           Non Physician Visit   Monday Jul 6, 2020 10:45 AM  [facility] AT [facility]   [phone]    [address] REMOTE SERVICE   T

## 2020-06-17 ENCOUNTER — HOSPITAL ENCOUNTER (OUTPATIENT)
Dept: CARDIOLOGY CLINIC | Facility: HOSPITAL | Age: 80
Discharge: HOME | DRG: 291 | End: 2020-06-17
Attending: NURSE PRACTITIONER
Payer: MEDICARE

## 2020-06-17 ENCOUNTER — HOSPITAL ENCOUNTER (INPATIENT)
Facility: HOSPITAL | Age: 80
LOS: 18 days | Discharge: HOSPICE | DRG: 291 | End: 2020-07-06
Attending: EMERGENCY MEDICINE | Admitting: INTERNAL MEDICINE
Payer: MEDICARE

## 2020-06-17 ENCOUNTER — APPOINTMENT (OUTPATIENT)
Dept: GENERAL RADIOLOGY | Facility: HOSPITAL | Age: 80
DRG: 291 | End: 2020-06-17
Attending: EMERGENCY MEDICINE
Payer: MEDICARE

## 2020-06-17 VITALS
OXYGEN SATURATION: 99 % | DIASTOLIC BLOOD PRESSURE: 44 MMHG | SYSTOLIC BLOOD PRESSURE: 98 MMHG | RESPIRATION RATE: 18 BRPM | WEIGHT: 251.81 LBS | BODY MASS INDEX: 36 KG/M2 | HEART RATE: 74 BPM

## 2020-06-17 DIAGNOSIS — E78.2 MIXED HYPERLIPIDEMIA: ICD-10-CM

## 2020-06-17 DIAGNOSIS — N18.4 CKD (CHRONIC KIDNEY DISEASE) STAGE 4, GFR 15-29 ML/MIN (HCC): ICD-10-CM

## 2020-06-17 DIAGNOSIS — E87.70 HYPERVOLEMIA, UNSPECIFIED HYPERVOLEMIA TYPE: Primary | ICD-10-CM

## 2020-06-17 DIAGNOSIS — Z86.79 HISTORY OF VENTRICULAR TACHYCARDIA: ICD-10-CM

## 2020-06-17 DIAGNOSIS — I50.814 BIVENTRICULAR HEART FAILURE WITH REDUCED LEFT VENTRICULAR FUNCTION (HCC): Primary | ICD-10-CM

## 2020-06-17 DIAGNOSIS — N17.9 ACUTE KIDNEY INJURY (HCC): ICD-10-CM

## 2020-06-17 LAB
ALBUMIN SERPL-MCNC: 3 G/DL (ref 3.4–5)
ALBUMIN/GLOB SERPL: 0.7 {RATIO} (ref 1–2)
ALP LIVER SERPL-CCNC: 109 U/L (ref 45–117)
ALT SERPL-CCNC: 7 U/L (ref 16–61)
ANION GAP SERPL CALC-SCNC: 11 MMOL/L (ref 0–18)
AST SERPL-CCNC: 34 U/L (ref 15–37)
ATRIAL RATE: 74 BPM
BASOPHILS # BLD AUTO: 0.03 X10(3) UL (ref 0–0.2)
BASOPHILS NFR BLD AUTO: 0.5 %
BILIRUB SERPL-MCNC: 1.1 MG/DL (ref 0.1–2)
BUN BLD-MCNC: 99 MG/DL (ref 7–18)
BUN/CREAT SERPL: 21.2 (ref 10–20)
CALCIUM BLD-MCNC: 8.4 MG/DL (ref 8.5–10.1)
CHLORIDE SERPL-SCNC: 91 MMOL/L (ref 98–112)
CO2 SERPL-SCNC: 31 MMOL/L (ref 21–32)
CREAT BLD-MCNC: 4.67 MG/DL (ref 0.7–1.3)
DEPRECATED RDW RBC AUTO: 70.4 FL (ref 35.1–46.3)
EOSINOPHIL # BLD AUTO: 0.3 X10(3) UL (ref 0–0.7)
EOSINOPHIL NFR BLD AUTO: 5.2 %
ERYTHROCYTE [DISTWIDTH] IN BLOOD BY AUTOMATED COUNT: 21.4 % (ref 11–15)
GLOBULIN PLAS-MCNC: 4.2 G/DL (ref 2.8–4.4)
GLUCOSE BLD-MCNC: 108 MG/DL (ref 70–99)
GLUCOSE BLD-MCNC: 110 MG/DL (ref 70–99)
GLUCOSE BLD-MCNC: 170 MG/DL (ref 70–99)
GLUCOSE BLD-MCNC: 87 MG/DL (ref 70–99)
HCT VFR BLD AUTO: 29 % (ref 39–53)
HGB BLD-MCNC: 9 G/DL (ref 13–17.5)
IMM GRANULOCYTES # BLD AUTO: 0.09 X10(3) UL (ref 0–1)
IMM GRANULOCYTES NFR BLD: 1.6 %
INR BLD: 1.38 (ref 0.86–1.11)
LYMPHOCYTES # BLD AUTO: 0.62 X10(3) UL (ref 1–4)
LYMPHOCYTES NFR BLD AUTO: 10.8 %
M PROTEIN MFR SERPL ELPH: 7.2 G/DL (ref 6.4–8.2)
MCH RBC QN AUTO: 28.8 PG (ref 26–34)
MCHC RBC AUTO-ENTMCNC: 31 G/DL (ref 31–37)
MCV RBC AUTO: 92.9 FL (ref 80–100)
MONOCYTES # BLD AUTO: 0.8 X10(3) UL (ref 0.1–1)
MONOCYTES NFR BLD AUTO: 13.9 %
NEUTROPHILS # BLD AUTO: 3.91 X10 (3) UL (ref 1.5–7.7)
NEUTROPHILS # BLD AUTO: 3.91 X10(3) UL (ref 1.5–7.7)
NEUTROPHILS NFR BLD AUTO: 68 %
NT-PROBNP SERPL-MCNC: ABNORMAL PG/ML (ref ?–450)
OSMOLALITY SERPL CALC.SUM OF ELEC: 306 MOSM/KG (ref 275–295)
P AXIS: 236 DEGREES
P-R INTERVAL: 252 MS
PLATELET # BLD AUTO: 123 10(3)UL (ref 150–450)
PLATELET MORPHOLOGY: NORMAL
POTASSIUM SERPL-SCNC: 3.3 MMOL/L (ref 3.5–5.1)
PSA SERPL DL<=0.01 NG/ML-MCNC: 17.2 SECONDS (ref 12.1–14.7)
Q-T INTERVAL: 472 MS
QRS DURATION: 116 MS
QTC CALCULATION (BEZET): 523 MS
R AXIS: 43 DEGREES
RBC # BLD AUTO: 3.12 X10(6)UL (ref 3.8–5.8)
SARS-COV-2 RNA RESP QL NAA+PROBE: NOT DETECTED
SODIUM SERPL-SCNC: 133 MMOL/L (ref 136–145)
T AXIS: 211 DEGREES
VENTRICULAR RATE: 74 BPM
WBC # BLD AUTO: 5.8 X10(3) UL (ref 4–11)

## 2020-06-17 PROCEDURE — 99215 OFFICE O/P EST HI 40 MIN: CPT | Performed by: NURSE PRACTITIONER

## 2020-06-17 PROCEDURE — 99223 1ST HOSP IP/OBS HIGH 75: CPT | Performed by: INTERNAL MEDICINE

## 2020-06-17 PROCEDURE — 99233 SBSQ HOSP IP/OBS HIGH 50: CPT | Performed by: INTERNAL MEDICINE

## 2020-06-17 PROCEDURE — 71045 X-RAY EXAM CHEST 1 VIEW: CPT | Performed by: EMERGENCY MEDICINE

## 2020-06-17 RX ORDER — MECLIZINE HYDROCHLORIDE 25 MG/1
25 TABLET ORAL 3 TIMES DAILY PRN
Status: DISCONTINUED | OUTPATIENT
Start: 2020-06-17 | End: 2020-07-06

## 2020-06-17 RX ORDER — ONDANSETRON 2 MG/ML
4 INJECTION INTRAMUSCULAR; INTRAVENOUS EVERY 6 HOURS PRN
Status: DISCONTINUED | OUTPATIENT
Start: 2020-06-17 | End: 2020-06-17

## 2020-06-17 RX ORDER — ALLOPURINOL 100 MG/1
100 TABLET ORAL DAILY
Status: DISCONTINUED | OUTPATIENT
Start: 2020-06-18 | End: 2020-07-06

## 2020-06-17 RX ORDER — DOBUTAMINE HYDROCHLORIDE 200 MG/100ML
5 INJECTION INTRAVENOUS CONTINUOUS
Status: DISCONTINUED | OUTPATIENT
Start: 2020-06-17 | End: 2020-07-06

## 2020-06-17 RX ORDER — GABAPENTIN 100 MG/1
100 CAPSULE ORAL NIGHTLY
Status: DISCONTINUED | OUTPATIENT
Start: 2020-06-17 | End: 2020-07-06

## 2020-06-17 RX ORDER — ISOSORBIDE DINITRATE 5 MG/1
5 TABLET ORAL EVERY 8 HOURS
Status: DISCONTINUED | OUTPATIENT
Start: 2020-06-17 | End: 2020-07-06

## 2020-06-17 RX ORDER — DEXTROSE MONOHYDRATE 25 G/50ML
50 INJECTION, SOLUTION INTRAVENOUS
Status: DISCONTINUED | OUTPATIENT
Start: 2020-06-17 | End: 2020-07-06

## 2020-06-17 RX ORDER — ACETAMINOPHEN 325 MG/1
650 TABLET ORAL EVERY 6 HOURS PRN
Status: DISCONTINUED | OUTPATIENT
Start: 2020-06-17 | End: 2020-07-06

## 2020-06-17 RX ORDER — HEPARIN SODIUM 5000 [USP'U]/ML
5000 INJECTION, SOLUTION INTRAVENOUS; SUBCUTANEOUS EVERY 8 HOURS SCHEDULED
Status: DISCONTINUED | OUTPATIENT
Start: 2020-06-17 | End: 2020-07-06

## 2020-06-17 RX ORDER — ALBUTEROL SULFATE 90 UG/1
1 AEROSOL, METERED RESPIRATORY (INHALATION) EVERY 6 HOURS PRN
COMMUNITY

## 2020-06-17 RX ORDER — AMIODARONE HYDROCHLORIDE 200 MG/1
200 TABLET ORAL DAILY
Status: DISCONTINUED | OUTPATIENT
Start: 2020-06-18 | End: 2020-07-06

## 2020-06-17 RX ORDER — HYDRALAZINE HYDROCHLORIDE 25 MG/1
25 TABLET, FILM COATED ORAL EVERY 8 HOURS SCHEDULED
Status: DISCONTINUED | OUTPATIENT
Start: 2020-06-17 | End: 2020-06-29

## 2020-06-17 RX ORDER — GABAPENTIN 100 MG/1
100 CAPSULE ORAL NIGHTLY
COMMUNITY

## 2020-06-17 RX ORDER — ASPIRIN 81 MG/1
81 TABLET ORAL DAILY
Status: DISCONTINUED | OUTPATIENT
Start: 2020-06-18 | End: 2020-07-06

## 2020-06-17 RX ORDER — ALBUTEROL SULFATE 90 UG/1
1 AEROSOL, METERED RESPIRATORY (INHALATION) EVERY 6 HOURS PRN
Status: DISCONTINUED | OUTPATIENT
Start: 2020-06-17 | End: 2020-07-06

## 2020-06-17 NOTE — ED PROVIDER NOTES
Patient Seen in: [facility] Emergency Department      History   Patient presents with:  Abnormal Result    Stated Complaint:     HPI     This is an 41-year-old man history of CAD CHF with EF of 15 to 20%, recently admitted for volume overload, retur 7/8/2015    Performed by Gray Morrison MD at [address] [facility] (EXTERNAL)  12/09/2019    PTA of the chronic occluded left mid to distal SFA, whcih was atherectomized , Dilated from 100% down to 0% Dr Montes [place] [address] 3.3 (*)     Chloride 91 (*)     BUN 99 (*)     Creatinine 4.67 (*)     BUN/CREA Ratio 21.2 (*)     Calcium, Total 8.4 (*)     Calculated Osmolality 306 (*)     GFR, Non- 11 (*)     GFR, -American 13 (*)     ALT 7 (*)     Albumin 3.0 block, normal axis, nonspecific changes, first-degree AV block is new compared to 6/12/2020           ED Course as of Jun 17 0487  ------------------------------------------------------------  Time: 06/17 3277  Comment: Spoke with Dr. Josef Riojas Cardiology  --- (DSR=[id number])  COMPARISON:  None. INDICATIONS:  evaluation of ascites  PATIENT STATED HISTORY: (As transcribed by Technologist)     FINDINGS:  There is an overall small amount of intra-abdominal ascites noted. This has a simple appearance.   CONCLUSION:  Srinivasa right pleural effusion and right basilar interstitial changes. CARDIAC:  Stable cardiomegaly, the patient is status post CABG surgery. Improved venous congestion.  MEDIASTINUM:  Normal. DIONY:                        Normal. BONES:  Marked degenerative velazquez Technologist)  Patient states shortness of breath    CONCLUSION:    Increasing haziness at the left mid-lower lung with blunting of the costophrenic angle consistent with probable increasing pneumonia/atelectasis, with the effusion appearing to be relative bleeding, infection, injury to blood vessels was discussed with the patient who agreed and elected to proceed. A time out was performed. The patient was draped and prepped in a sterile fashion in the supine position on the fluoroscopy table.  Maximum steri at 12:58 PM              MDM     58-year-old man here with volume overload, recently admitted for same, labs significant for acute kidney injury, elevated BNP, discussed the case with cardiology as well as nephrology, patient was started on a Bumex drip.

## 2020-06-17 NOTE — H&P
[facility] Hospitalist History and Physical      Patient presents with:  Abnormal Result       PCP: José De La O MD      History of Present Illness: Patient is a [de-identified]year old male with PMH sig for chronic systolic CHF (EF 51-94%), CAD, PVD, HTN, HLD, DM II chamber ICD implant BSCI.  Dr. Carlos Eduardo Nunez   • [address] N/A 7/8/2015    Performed by Rocío Fuller MD at [address] (EXTERNAL)  12/09/2019    PTA of the chronic occluded Tab EC, Take 1 tablet (81 mg total) by mouth daily. amiodarone HCl 200 MG Oral Tab, Take 1 tablet (200 mg total) by mouth daily. Meclizine HCl 25 MG Oral Tab, Take 25 mg by mouth 3 (three) times daily as needed.   Fluticasone Propionate 50 MCG/ACT Nasal S pitting edema to the thighs. B/l shins in wrapping.     Skin: no rashes or lesions  Neuro:  Grossly intact, no focal deficits  Lines:  Permcath in R chest      Data Review:    LABS:   Lab Results   Component Value Date    WBC 5.8 06/17/2020    HGB 9.0 06/1 OTHER:  None. CONCLUSION:  Chronic brain changes as described. No acute bleed. No CT features for acute territorial infarct.    Dictated by: Tianna Gunter MD on 6/05/2020 at 6:56 PM     Finalized by: Tianna Gunter MD on 6/05/2020 at 6:57 PM The pacemaker leads are unchanged. The battery pack obscures portions of the left lung. The right CVP line tip tip is in the SVC. There is decrease in the pulmonary edema in interstitial edema compared to the study of 6/10/2020.   There is left lower lob 6/10/2020 at 6:57 AM          Xr Chest Ap Portable  (cpt=71045)    Result Date: 6/6/2020  PROCEDURE:  XR CHEST AP PORTABLE  (CPT=71045)  TECHNIQUE:  AP chest radiograph was obtained.   COMPARISON:  [facility] , XR, XR CHEST AP PORTABLE  (CPT=71045), 6/05/2020, by: Shu Barbour MD on 6/05/2020 at 6:26 PM          Ir Central Venous Access    Result Date: 6/10/2020  PROCEDURE:  IR CENTRAL VENOUS ACCESS  INDICATIONS:  Tunneled line for home Iv Dobutamine  DESCRIPTION OF PROCEDURE:  Written consent was obtained f administered for the procedure with the RN monitoring the oxygen, heart rate and blood  pressure under my direct face to face supervision for 11 minutes of conscious sedation. CONCLUSION:  Technically successful tunneled central line placement.       Dict bleeding, monitor  - iron sat low -->s/p IV iron last admit     # Chronic Thrombocytopenia  - Plt over stable  - monitor     Prophy:  DVT: heparin subQ    Dispo: inpt care. Plan of care d/w pt who agrees.      Outpatient records or previous hospital record

## 2020-06-17 NOTE — CONSULTS
[facility]  Report of Consultation    Yovany Hinojosa Patient Status:  Emergency    1940 MRN [id number]   Location [address] Attending Magdi Slater MD   Hosp Day # 0 PCP Lucia Yepez MD     Reason for Cons Bilateral 2000 2002   • COLONOSCOPY     • COLONOSCOPY     • EP DUAL CHAMBER ICD  01/16/2019    [address] [facility] dual chamber ICD implant BSCI.  Dr. Danielito Mejias   • HEART CORONARY ARTERY BYPASS GRAFT N/A 7/8/2015    Performed by Cait Smith MD at [facility] [address] [place] [address] tablets (12.5 mg total) by mouth daily. allopurinol 100 MG Oral Tab, Take 1 tablet (100 mg total) by mouth daily. furosemide 40 MG Oral Tab, Take 40 mg by mouth 2 (two) times daily.     metolazone 5 MG Oral Tab, Take 1 tablet (5 mg total) by mouth 3 (thre Inject 1 Device into the skin daily.         Review of Systems:  Denies fever/chills  + wt gain  Denies HA or visual changes  Denies CP or palpitations  Denies SOB/cough/hemoptysis  Denies abd or flank pain  Denies N/V/D  Denies change in urinary habits or (H)   06/11/2020 3.07 (H)   06/03/2020 2.85 (H)   05/27/2020 2.65 (H)   04/20/2020 2.22 (H)       Malb/Cre Calc Ratio   Date Value Ref Range Status   08/30/2019 221.3 (H) 0.0 - 29.0 ug/mg Final     Malb/Cre Calc   Date Value Ref Range Status   01/08/2019 1 ED      Thank you for allowing me to participate in the care of your patient. Please do not hesitate to call with any questions or concerns.        Leonardo Parsons  6/17/2020  3:23 PM

## 2020-06-17 NOTE — PLAN OF CARE
New admission for irregular labs. Sent from heart failure clinic. Patient ox4. Slight sob. 2L NC,  . Last discharge patient had 2L NC at home continuously. +1 pitting BLE. Bilateral leg dressings changed. High fall precautions in place.  Call light withi

## 2020-06-17 NOTE — ED INITIAL ASSESSMENT (HPI)
Pt sent to ER by Heart Failure Clinic for further evaluation of abnormal labs, Creatinine 4.76 & BNP elevated, Pt walked down by RN from the heart failure clinic who advised Pt became weak in the elevator and wanted Pt's blood glucose level checked immedia

## 2020-06-17 NOTE — PROGRESS NOTES
[facility] for Cardiac Health Progress Note    Dawna Preciado is a [de-identified]year old male who presents to clinic at the request of Dr. Nereyda Murphy, patient of Dr. 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 , Disp: 30 tablet, Rfl: 5  •  allopurinol 100 MG Oral Tab, Take 1 tablet (100 mg total) by mouth wheezing  CV:                 S1,S2 regular with S3  Abdomen:      Distended, firm, non-tender, BS+  Extremities:    +2-3 pitting LE edema up to hips; 2+ pedal pulses  Skin:               Pink, warm, dry   Neurological:  A&O x 3; CORBETT    Cardiographics:  EC arteries: Systolic pressure was moderately increased, in the     range of 45mm Hg to 50mm Hg.       Imaging:  Chest x-ray 6/10/2020:  CONCLUSION:    1.  Increasing interstitial and airspace opacities bilaterally when compared to 6/6/2020 chest radiograph, c

## 2020-06-17 NOTE — ED NOTES
MD at bedside to speak with pt. Spoke with MD regarding pt. Holding orders at this time, waiting for update.

## 2020-06-17 NOTE — PROGRESS NOTES
[facility] Patient assessed. Pt has increased SOB with a productive cough and yellow sputum for the past 4 days. The patient reports intermittent dizziness when he stands up.  The patient lives at home with his wif

## 2020-06-18 LAB
ALBUMIN SERPL-MCNC: 2.8 G/DL (ref 3.4–5)
ALBUMIN/GLOB SERPL: 0.7 {RATIO} (ref 1–2)
ALP LIVER SERPL-CCNC: 96 U/L (ref 45–117)
ALT SERPL-CCNC: <6 U/L (ref 16–61)
ANION GAP SERPL CALC-SCNC: 8 MMOL/L (ref 0–18)
AST SERPL-CCNC: 27 U/L (ref 15–37)
BASOPHILS # BLD AUTO: 0.03 X10(3) UL (ref 0–0.2)
BASOPHILS NFR BLD AUTO: 0.5 %
BILIRUB SERPL-MCNC: 1 MG/DL (ref 0.1–2)
BUN BLD-MCNC: 95 MG/DL (ref 7–18)
BUN/CREAT SERPL: 22.1 (ref 10–20)
CALCIUM BLD-MCNC: 8.2 MG/DL (ref 8.5–10.1)
CHLORIDE SERPL-SCNC: 92 MMOL/L (ref 98–112)
CO2 SERPL-SCNC: 32 MMOL/L (ref 21–32)
CREAT BLD-MCNC: 4.29 MG/DL (ref 0.7–1.3)
DEPRECATED RDW RBC AUTO: 70.3 FL (ref 35.1–46.3)
EOSINOPHIL # BLD AUTO: 0.15 X10(3) UL (ref 0–0.7)
EOSINOPHIL NFR BLD AUTO: 2.5 %
ERYTHROCYTE [DISTWIDTH] IN BLOOD BY AUTOMATED COUNT: 21.6 % (ref 11–15)
GLOBULIN PLAS-MCNC: 4.1 G/DL (ref 2.8–4.4)
GLUCOSE BLD-MCNC: 110 MG/DL (ref 70–99)
GLUCOSE BLD-MCNC: 155 MG/DL (ref 70–99)
GLUCOSE BLD-MCNC: 155 MG/DL (ref 70–99)
GLUCOSE BLD-MCNC: 206 MG/DL (ref 70–99)
GLUCOSE BLD-MCNC: 89 MG/DL (ref 70–99)
HCT VFR BLD AUTO: 26.1 % (ref 39–53)
HGB BLD-MCNC: 8.2 G/DL (ref 13–17.5)
IMM GRANULOCYTES # BLD AUTO: 0.07 X10(3) UL (ref 0–1)
IMM GRANULOCYTES NFR BLD: 1.2 %
LYMPHOCYTES # BLD AUTO: 0.61 X10(3) UL (ref 1–4)
LYMPHOCYTES NFR BLD AUTO: 10.3 %
M PROTEIN MFR SERPL ELPH: 6.9 G/DL (ref 6.4–8.2)
MCH RBC QN AUTO: 29.1 PG (ref 26–34)
MCHC RBC AUTO-ENTMCNC: 31.4 G/DL (ref 31–37)
MCV RBC AUTO: 92.6 FL (ref 80–100)
MONOCYTES # BLD AUTO: 0.78 X10(3) UL (ref 0.1–1)
MONOCYTES NFR BLD AUTO: 13.2 %
NEUTROPHILS # BLD AUTO: 4.27 X10 (3) UL (ref 1.5–7.7)
NEUTROPHILS # BLD AUTO: 4.27 X10(3) UL (ref 1.5–7.7)
NEUTROPHILS NFR BLD AUTO: 72.3 %
OSMOLALITY SERPL CALC.SUM OF ELEC: 303 MOSM/KG (ref 275–295)
PLATELET # BLD AUTO: 119 10(3)UL (ref 150–450)
POTASSIUM SERPL-SCNC: 3.2 MMOL/L (ref 3.5–5.1)
RBC # BLD AUTO: 2.82 X10(6)UL (ref 3.8–5.8)
SODIUM SERPL-SCNC: 132 MMOL/L (ref 136–145)
WBC # BLD AUTO: 5.9 X10(3) UL (ref 4–11)

## 2020-06-18 PROCEDURE — 99233 SBSQ HOSP IP/OBS HIGH 50: CPT | Performed by: INTERNAL MEDICINE

## 2020-06-18 RX ORDER — POTASSIUM CHLORIDE 20 MEQ/1
40 TABLET, EXTENDED RELEASE ORAL ONCE
Status: COMPLETED | OUTPATIENT
Start: 2020-06-18 | End: 2020-06-18

## 2020-06-18 NOTE — PROGRESS NOTES
Assumed care of patient at 0700. Alert Ox3  Wife at bedside  Tele= 5 bt NSVT at 0720 this am- non further- otherwise, SR 1st degree AV block and BBB noted. 1+ edema to LE noted. Abdomen large distended but soft. Edema noted to thighs too.   Lungs with e

## 2020-06-18 NOTE — PROGRESS NOTES
[facility] Hospitalist Progress Note                                                                   [address] [facility]  7/26/1940    SUBJECTIVE:  Pt seen and examined.   States he feels a little b 200 mg Oral Daily   • aspirin  81 mg Oral Daily   • hydrALAzine HCl  25 mg Oral Q8H [address]   • isosorbide dinitrate  5 mg Oral Q8H   • allopurinol  100 mg Oral Daily   • cholecalciferol  1,000 Units Oral QOD   • gabapentin  100 mg Oral Nightly     Continuous and accuecheck  - insulin nightly -- patient supplying glargine -- 14 units  Nightly  - diabetic diet  - patient with reported allergy to aspart  - Accuchecks, hold on SSC with allergy listed     # Chronic anemia, suspect due to AOCD and iron deficiency

## 2020-06-18 NOTE — DIETARY NOTE
[facility]     Admitting diagnosis:  Acute kidney injury ([facility] [address] [N17.9]  Hypervolemia, unspecified hypervolemia type [E87.70]    Ht: 177.8 cm (5' 10\")  Wt: 111.4 kg (245 lb 9.5 oz).  This is 148 % of IBW  Body

## 2020-06-18 NOTE — PHYSICAL THERAPY NOTE
PHYSICAL THERAPY QUICK EVALUATION - INPATIENT    Room Number: 1143/7807-E  Evaluation Date: 6/18/2020  Presenting Problem: CHF  Physician Order: PT Eval and Treat    Problem List  Principal Problem:    Hypervolemia, unspecified hypervolemia type  Active Spouse  Drives: No  Patient Owned Equipment: (rollator)  Patient Regularly Uses: Glasses    Prior Level of [place]: Pt lives with spouse in single story home. Pt typically independent with ADL. Pt ambulates with rollator.  Pts spouse supervises showers walker  Pattern: (slow yasmany)          Skilled Therapy Provided: (PPE worn: mask and gloves) Pt received sitting up in bedside chair and is agreeable to therapy. 02 sat 95% on 2L 02 at rest. Pt sit-stand with RW and supervision.  Pt ambulated 200ft with R

## 2020-06-18 NOTE — CM/SW NOTE
Pt is a readmit from last week. Noted that pt is current with [place] [id number] services for RN and OT. Informed liaison, JG orders placed. AVS updated. Will continue to assist as needed for discharge planning.     [facility]  [phone]

## 2020-06-18 NOTE — PROGRESS NOTES
[facility]  Cardiology Progress Note    Dawna Preciado Patient Status:  Observation    1940 MRN [id number]   [facility] 2NE-A Attending Vikram Mejía MD   Hosp Day # 0 PCP Shahida Ocasio MD     Subjective:  Ofelia Branham isosorbide dinitrate  5 mg Oral Q8H   • allopurinol  100 mg Oral Daily   • cholecalciferol  1,000 Units Oral QOD   • gabapentin  100 mg Oral Nightly     • bumetanide (BUMEX) 0.125 mg/ml infusion 1 mg/hr (06/17/20 1523)   • bumetanide (BUMEX) 0.125 mg/ml in has end-stage heart failure. Potassium 3.2  Creatinine 4.2  BUN 95  Sodium 132  proBNP 11,384  Hemoglobin 8.2  Platelet count 186    He could not tolerate dobutamine last time and milrinone was causing significant hypotension during last admission.   Hop

## 2020-06-18 NOTE — CONSULTS
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 B LE with healed wounds, no open wound but appear to have minor-epidermis, there is a dry scab to the R medial great toe (2.0cm x 1.5cm) with 100% dry scab.       Cleansed B LE with soap and water, applied

## 2020-06-18 NOTE — PROGRESS NOTES
[facility] Cardiology  Progress Note    [facility] Patient Status:  Observation    1940 MRN [id number]   [facility] 2NE-A Attending Una Sánchez MD   Hosp Day # 0 PCP MD Regina Leon ANGIOPLASTY (CORONARY)   2000     Stent LAD    • CABG   2015     Triple CABG Dr. Samra Borja    • Hardik Ajith       • CATARACT       • CEA Bilateral 2000 2002   • COLONOSCOPY       • COLONOSCOPY       • EP DUAL CHAMBER ICD   01/16/2019 warm, dry   Neurological:  A&O x 3; CORBETT     Cardiac Rhythm: Ventricular paced           Lab Results   Component Value Date     CREATSERUM 4.76 06/17/2020     BUN 99 06/17/2020      06/17/2020     K 3.4 06/17/2020     CL 91 06/17/2020     CO2 32.0 06/ Propionate 50 MCG/ACT Nasal Suspension, 1-2 sprays by Nasal route daily. in each nostril. Point it up and out towards your eye.   Call if you get nose bleeding, Disp: 16 g, Rfl: 11  •  B Complex-C-Folic Acid (PX B COMPLEX/VITAMIN C) Oral Tab, Take 1 tablet valve: Normal thickness leaflets. Transvalvular velocity was     within the normal range. There was no evidence for stenosis. There was     moderate regurgitation.   7. Pulmonary arteries: Systolic pressure was moderately increased, in the     range of 45mm otherwise he is slowly progressing to hospice. Inotrope was his last chance but if we he can tolerate it VT wise. So far stable sinus rhythm.   Ideally we would be nice to send him home on inotrope to avoid readmissions but it is not possible we will invo

## 2020-06-18 NOTE — PROGRESS NOTES
[facility]  Nephrology Progress Note    Edilberto Em Patient Status:  Observation    1940 MRN [id number]   [facility] 2NE-A Attending Celena Jefferson MD   Hosp Day # 0 PCP Dean Perez MD       SUBJECTIVE:  Soni Law 06/17/20  1131 06/17/20  1442 06/18/20  0445   * 132* 133* 132*   K 3.7 3.4* 3.3* 3.2*   CL 94* 91* 91* 92*   CO2 34.0* 32.0 31.0 32.0   BUN 77* 99* 99* 95*   CREATSERUM 3.21* 4.76* 4.67* 4.29*   CA 8.4* 8.3* 8.4* 8.2*   MG 2.2  --   --   --    GLU 1 tablet, 8 tablet, Oral, Q15 Min PRN          Impression/Plan:       #1. SVETLANA/CKD IV- has had longstanding CKD related to HTN/DM in setting of severe CM. Baseline creatinine in the past had been generally close to 2.5 mg/dL or so.   He presented with joão

## 2020-06-18 NOTE — PLAN OF CARE
Assumed care of patient at 1. Patient resting in bed, AOX4. Lethargic, fatigued. Oxygenating >90% on 2L NC. NSR on tele. VSS. Lung sounds diminished. Abdomen firm and distended. Mild edema to BLE. Dobutamine gtt and bumex gtt infusing per orders.  Cardio Evaluate fluid balance, assess for edema, trend weights  Outcome: Progressing  Goal: Absence of cardiac arrhythmias or at baseline  Description  INTERVENTIONS:  - Continuous cardiac monitoring, monitor vital signs, obtain 12 lead EKG if indicated  - Evalua

## 2020-06-19 LAB
ANION GAP SERPL CALC-SCNC: 5 MMOL/L (ref 0–18)
BASOPHILS # BLD AUTO: 0.02 X10(3) UL (ref 0–0.2)
BASOPHILS NFR BLD AUTO: 0.4 %
BUN BLD-MCNC: 96 MG/DL (ref 7–18)
BUN/CREAT SERPL: 23.2 (ref 10–20)
CALCIUM BLD-MCNC: 8.7 MG/DL (ref 8.5–10.1)
CHLORIDE SERPL-SCNC: 93 MMOL/L (ref 98–112)
CO2 SERPL-SCNC: 34 MMOL/L (ref 21–32)
CREAT BLD-MCNC: 4.14 MG/DL (ref 0.7–1.3)
DEPRECATED RDW RBC AUTO: 72.8 FL (ref 35.1–46.3)
EOSINOPHIL # BLD AUTO: 0.14 X10(3) UL (ref 0–0.7)
EOSINOPHIL NFR BLD AUTO: 2.7 %
ERYTHROCYTE [DISTWIDTH] IN BLOOD BY AUTOMATED COUNT: 22.2 % (ref 11–15)
GLUCOSE BLD-MCNC: 102 MG/DL (ref 70–99)
GLUCOSE BLD-MCNC: 104 MG/DL (ref 70–99)
GLUCOSE BLD-MCNC: 133 MG/DL (ref 70–99)
GLUCOSE BLD-MCNC: 143 MG/DL (ref 70–99)
GLUCOSE BLD-MCNC: 182 MG/DL (ref 70–99)
HCT VFR BLD AUTO: 26.6 % (ref 39–53)
HGB BLD-MCNC: 8.2 G/DL (ref 13–17.5)
IMM GRANULOCYTES # BLD AUTO: 0.05 X10(3) UL (ref 0–1)
IMM GRANULOCYTES NFR BLD: 1 %
LYMPHOCYTES # BLD AUTO: 0.52 X10(3) UL (ref 1–4)
LYMPHOCYTES NFR BLD AUTO: 10 %
MCH RBC QN AUTO: 28.5 PG (ref 26–34)
MCHC RBC AUTO-ENTMCNC: 30.8 G/DL (ref 31–37)
MCV RBC AUTO: 92.4 FL (ref 80–100)
MONOCYTES # BLD AUTO: 0.7 X10(3) UL (ref 0.1–1)
MONOCYTES NFR BLD AUTO: 13.5 %
NEUTROPHILS # BLD AUTO: 3.76 X10 (3) UL (ref 1.5–7.7)
NEUTROPHILS # BLD AUTO: 3.76 X10(3) UL (ref 1.5–7.7)
NEUTROPHILS NFR BLD AUTO: 72.4 %
OSMOLALITY SERPL CALC.SUM OF ELEC: 304 MOSM/KG (ref 275–295)
PLATELET # BLD AUTO: 117 10(3)UL (ref 150–450)
POTASSIUM SERPL-SCNC: 3.6 MMOL/L (ref 3.5–5.1)
RBC # BLD AUTO: 2.88 X10(6)UL (ref 3.8–5.8)
SODIUM SERPL-SCNC: 132 MMOL/L (ref 136–145)
WBC # BLD AUTO: 5.2 X10(3) UL (ref 4–11)

## 2020-06-19 PROCEDURE — 99233 SBSQ HOSP IP/OBS HIGH 50: CPT | Performed by: INTERNAL MEDICINE

## 2020-06-19 PROCEDURE — 99232 SBSQ HOSP IP/OBS MODERATE 35: CPT | Performed by: INTERNAL MEDICINE

## 2020-06-19 RX ORDER — LIDOCAINE HYDROCHLORIDE 20 MG/ML
10 JELLY TOPICAL ONCE
Status: DISCONTINUED | OUTPATIENT
Start: 2020-06-19 | End: 2020-07-06

## 2020-06-19 RX ORDER — TAMSULOSIN HYDROCHLORIDE 0.4 MG/1
0.4 CAPSULE ORAL DAILY
Status: DISCONTINUED | OUTPATIENT
Start: 2020-06-19 | End: 2020-07-06

## 2020-06-19 RX ORDER — CLOTRIMAZOLE AND BETAMETHASONE DIPROPIONATE 10; .64 MG/G; MG/G
CREAM TOPICAL 2 TIMES DAILY
Status: DISCONTINUED | OUTPATIENT
Start: 2020-06-19 | End: 2020-07-06

## 2020-06-19 RX ORDER — LIDOCAINE AND PRILOCAINE 25; 25 MG/G; MG/G
CREAM TOPICAL ONCE
Status: DISCONTINUED | OUTPATIENT
Start: 2020-06-19 | End: 2020-07-06

## 2020-06-19 NOTE — PROGRESS NOTES
[facility]  Nephrology Progress Note    Harlan Gannon Patient Status:  Observation    1940 MRN [id number]   [facility] 2NE-A Attending Estefani Gaspar MD   Hosp Day # 1 PCP Jeanine Liu MD       SUBJECTIVE:  No c 06/19/20  0520   * 133* 132* 132*   K 3.4* 3.3* 3.2* 3.6   CL 91* 91* 92* 93*   CO2 32.0 31.0 32.0 34.0*   BUN 99* 99* 95* 96*   CREATSERUM 4.76* 4.67* 4.29* 4.14*   CA 8.3* 8.4* 8.2* 8.7   * 87 89 104*       Recent Labs   Lab 06/17/20  1442 0 CM.  Baseline creatinine in the past had been generally close to 2.5 mg/dL or so. He presented with constellation of sx c/w cardiorenal syndrome with wt gain/abd distention/LE edema.   Vol status and renal fxn clearly worse with no inotropic support and he

## 2020-06-19 NOTE — PLAN OF CARE
Assumed care of patient at [address]. Patient resting in bed, AOX4. Oxygenating >90% on 2L NC. NSR on tele-monitoring closely for V-tach. Dobutamine gtt, bumex gtt infusing per orders. CHF protocol in place.  Lung sounds diminished bilaterally, abdomen distended Absence of cardiac arrhythmias or at baseline  Description  INTERVENTIONS:  - Continuous cardiac monitoring, monitor vital signs, obtain 12 lead EKG if indicated  - Evaluate effectiveness of antiarrhythmic and heart rate control medications as ordered  - I

## 2020-06-19 NOTE — PROGRESS NOTES
· [facility]      Subjective:  Dyspnea and edema are improving. Says he doesn't have the heart racing like last admit (which was VT).   Reviewed with wife at bedside    Objective:  Afebrile  SR 70s  91/42  I/O  -990 weight down 8#  BUN/cr 96/4 hopefully will be able to go home on Dobutamine    Tamara Fischer NP      I agree with above note and plan. I seen and examined the patient. The chart was reviewed.     I am happy that tis time he has no significant VT yet from Dobutamine drip since a goal

## 2020-06-19 NOTE — PROGRESS NOTES
[facility] Hospitalist Progress Note                                                                   [address] [facility]  7/26/1940    SUBJECTIVE:  Pt seen and examined.   States he feels a bit bett amiodarone HCl  200 mg Oral Daily   • aspirin  81 mg Oral Daily   • hydrALAzine HCl  25 mg Oral Q8H [facility]   • isosorbide dinitrate  5 mg Oral Q8H   • allopurinol  100 mg Oral Daily   • cholecalciferol  1,000 Units Oral QOD   • gabapentin  100 mg Oral Nightly now     # Hx SVT  - Continue home amiodarone  - Tele, sinus rhythm     # DM Type II with hyperglycemia   - SSC and accuecheck  - insulin nightly -- patient supplying glargine -- 14 units  Nightly  - diabetic diet  - patient with reported allergy to aspart

## 2020-06-20 LAB
ANION GAP SERPL CALC-SCNC: 4 MMOL/L (ref 0–18)
BASOPHILS # BLD AUTO: 0.03 X10(3) UL (ref 0–0.2)
BASOPHILS NFR BLD AUTO: 0.6 %
BUN BLD-MCNC: 100 MG/DL (ref 7–18)
BUN/CREAT SERPL: 25.1 (ref 10–20)
CALCIUM BLD-MCNC: 8.8 MG/DL (ref 8.5–10.1)
CHLORIDE SERPL-SCNC: 92 MMOL/L (ref 98–112)
CO2 SERPL-SCNC: 36 MMOL/L (ref 21–32)
CREAT BLD-MCNC: 3.99 MG/DL (ref 0.7–1.3)
DEPRECATED RDW RBC AUTO: 73.9 FL (ref 35.1–46.3)
EOSINOPHIL # BLD AUTO: 0.21 X10(3) UL (ref 0–0.7)
EOSINOPHIL NFR BLD AUTO: 4.2 %
ERYTHROCYTE [DISTWIDTH] IN BLOOD BY AUTOMATED COUNT: 22.2 % (ref 11–15)
GLUCOSE BLD-MCNC: 122 MG/DL (ref 70–99)
GLUCOSE BLD-MCNC: 145 MG/DL (ref 70–99)
GLUCOSE BLD-MCNC: 199 MG/DL (ref 70–99)
GLUCOSE BLD-MCNC: 252 MG/DL (ref 70–99)
GLUCOSE BLD-MCNC: 85 MG/DL (ref 70–99)
GLUCOSE BLD-MCNC: 88 MG/DL (ref 70–99)
HCT VFR BLD AUTO: 25.9 % (ref 39–53)
HGB BLD-MCNC: 8.1 G/DL (ref 13–17.5)
IMM GRANULOCYTES # BLD AUTO: 0.05 X10(3) UL (ref 0–1)
IMM GRANULOCYTES NFR BLD: 1 %
LYMPHOCYTES # BLD AUTO: 0.55 X10(3) UL (ref 1–4)
LYMPHOCYTES NFR BLD AUTO: 11 %
MCH RBC QN AUTO: 28.8 PG (ref 26–34)
MCHC RBC AUTO-ENTMCNC: 31.3 G/DL (ref 31–37)
MCV RBC AUTO: 92.2 FL (ref 80–100)
MONOCYTES # BLD AUTO: 0.67 X10(3) UL (ref 0.1–1)
MONOCYTES NFR BLD AUTO: 13.5 %
NEUTROPHILS # BLD AUTO: 3.47 X10 (3) UL (ref 1.5–7.7)
NEUTROPHILS # BLD AUTO: 3.47 X10(3) UL (ref 1.5–7.7)
NEUTROPHILS NFR BLD AUTO: 69.7 %
OSMOLALITY SERPL CALC.SUM OF ELEC: 304 MOSM/KG (ref 275–295)
PLATELET # BLD AUTO: 116 10(3)UL (ref 150–450)
POTASSIUM SERPL-SCNC: 3.4 MMOL/L (ref 3.5–5.1)
RBC # BLD AUTO: 2.81 X10(6)UL (ref 3.8–5.8)
SODIUM SERPL-SCNC: 132 MMOL/L (ref 136–145)
WBC # BLD AUTO: 5 X10(3) UL (ref 4–11)

## 2020-06-20 PROCEDURE — 99233 SBSQ HOSP IP/OBS HIGH 50: CPT | Performed by: INTERNAL MEDICINE

## 2020-06-20 RX ORDER — POTASSIUM CHLORIDE 20 MEQ/1
40 TABLET, EXTENDED RELEASE ORAL ONCE
Status: COMPLETED | OUTPATIENT
Start: 2020-06-20 | End: 2020-06-20

## 2020-06-20 NOTE — PROGRESS NOTES
[facility] Hospitalist Progress Note                                                                   [address] [facility]  7/26/1940    SUBJECTIVE:  Pt seen and examined. Sitting in chair.  On bumex Daily   • clotrimazole-betamethasone   Topical BID   • insulin glargine  14 Units Subcutaneous Nightly   • Heparin Sodium (Porcine)  5,000 Units Subcutaneous Q8H [address]   • amiodarone HCl  200 mg Oral Daily   • aspirin  81 mg Oral Daily   • hydrALAzine HCl  2 cellulitits, resolving; heel wound   - s/p course of IV cefazolin and po keflex last admit  - Wound c/s for LE sores, suspect related to swelling from acute CHF  - monitor     # Hx CAD  # HTN/HLD  -  ASA  - hold hydralazine, isordil for now     # Hx SVT  -

## 2020-06-20 NOTE — PROGRESS NOTES
[facility]  Nephrology Progress Note    Liset Mejia Patient Status:  Observation    1940 MRN [id number]   [facility] 2NE-A Attending Dharmesh Palencia MD   Hosp Day # 2 PCP Grover Winston MD       SUBJECTIVE:  Stab 92.2   .0* 119.0* 117.0* 116.0*   INR 1.38*  --   --   --        Recent Labs   Lab 06/17/20  1131 06/17/20  1442 06/18/20  0445 06/19/20  0520 06/20/20  0533   * 133* 132* 132* 132*   K 3.4* 3.3* 3.2* 3.6 3.4*   CL 91* 91* 92* 93* 92*   CO2 32 Q15 Min PRN    Or  Glucose-Vitamin C (DEX-4) chewable tab 4 tablet, 4 tablet, Oral, Q15 Min PRN    Or  dextrose 50 % injection 50 mL, 50 mL, Intravenous, Q15 Min PRN    Or  glucose (DEX4) oral liquid 30 g, 30 g, Oral, Q15 Min PRN    Or  Glucose-Vitamin C (

## 2020-06-20 NOTE — PLAN OF CARE
Patient sitting up in chair, denies CP, SOB and dizziness. Dobutamine gtt infusing per order. Bumex gtt infusing per order. Right chest central line, double lumen, flushing with blood return, no complications. CHG bath completed.  Edema noted to bilateral u

## 2020-06-20 NOTE — PLAN OF CARE
Pt denies c/o pain, malaise, or cardiac symptoms. A&Ox4, forgetful, relieved with reorientation and rest. Lungs diminished bilaterally, equal expansion, 93% on 2L O2 NC. Pt NSR on monitor with frequent PVC, regular rate.  Abdomen round and distended with ac for signs of decreased coronary artery perfusion - ex.  Angina  - Evaluate fluid balance, assess for edema, trend weights  Outcome: Progressing  Goal: Absence of cardiac arrhythmias or at baseline  Description  INTERVENTIONS:  - Continuous cardiac monitorin

## 2020-06-20 NOTE — PROGRESS NOTES
· [facility]      Subjective:  No dyspnea, edema persists.     Objective:  Afebrile  SR 70s NSVT  100/47  I/O  -636  BUN/cr 100/3.99  Hgb 8.1  plt 116    Neuro: awake/alert  HEENT: JVD  Cardiac: S1 S2 regular 2/6 systolic murmur  Lungs: clear a reviewed. End-stage heart failure with acute renal failure and cardiorenal syndrome is being tuned up with IV Bumex drip and dobutamine drip. Patient improved clinically and encephalopathy improved.   He is low cardiac output state and responded to inot

## 2020-06-20 NOTE — CONSULTS
[facility]  Report of Consultation    Lokihemanth Rivasniki Patient Status:  Inpatient    1940 MRN [id number]   [facility] 2NE-A Attending Radha Morgan MD   Hosp Day # 1 PCP Xavier Marie MD     Reason for Consultation to 0% Dr Lambert Marinelli   • TONSILLECTOMY     • TOTAL KNEE REPLACEMENT       Family History   Problem Relation Age of Onset   • Cancer Father    • Heart Disorder Mother    • No Known Problems Son    • Other (Other) Sister    • Cancer Brother    • No Know hydrALAzine HCl (APRESOLINE) tab 25 mg, 25 mg, Oral, Q8H [facility]  •  isosorbide dinitrate (ISORDIL TITRADOSE) tab 5 mg, 5 mg, Oral, Q8H  •  Albuterol Sulfate  (90 Base) MCG/ACT inhaler 1 puff, 1 puff, Inhalation, Q6H PRN  •  allopurinol (ZYLOPRIM) tab 1 4.14 06/19/2020    BUN 96 06/19/2020     06/19/2020    K 3.6 06/19/2020    CL 93 06/19/2020    CO2 34.0 06/19/2020     06/19/2020    CA 8.7 06/19/2020    PGLU 182 06/19/2020          Imaging:      Impression:  Patient Active Problem List: Volume overload      BPH WI/ LUTS  PARTIAL RETENTION  PHIMOSIS  Glandular adhesions    Recommendations:  Betamethasone to glandular adhesions bid  Neosporin with pain relief to glans PRN  Future BPH w/u as an outpt  Start flomax  RTC after d/c for further

## 2020-06-21 LAB
ANION GAP SERPL CALC-SCNC: 8 MMOL/L (ref 0–18)
BUN BLD-MCNC: 109 MG/DL (ref 7–18)
BUN/CREAT SERPL: 28 (ref 10–20)
CALCIUM BLD-MCNC: 8.6 MG/DL (ref 8.5–10.1)
CHLORIDE SERPL-SCNC: 91 MMOL/L (ref 98–112)
CO2 SERPL-SCNC: 33 MMOL/L (ref 21–32)
CREAT BLD-MCNC: 3.89 MG/DL (ref 0.7–1.3)
GLUCOSE BLD-MCNC: 105 MG/DL (ref 70–99)
GLUCOSE BLD-MCNC: 149 MG/DL (ref 70–99)
GLUCOSE BLD-MCNC: 168 MG/DL (ref 70–99)
GLUCOSE BLD-MCNC: 87 MG/DL (ref 70–99)
GLUCOSE BLD-MCNC: 90 MG/DL (ref 70–99)
OSMOLALITY SERPL CALC.SUM OF ELEC: 308 MOSM/KG (ref 275–295)
POTASSIUM SERPL-SCNC: 3.4 MMOL/L (ref 3.5–5.1)
SODIUM SERPL-SCNC: 132 MMOL/L (ref 136–145)

## 2020-06-21 PROCEDURE — 99233 SBSQ HOSP IP/OBS HIGH 50: CPT | Performed by: INTERNAL MEDICINE

## 2020-06-21 RX ORDER — BENZONATATE 100 MG/1
100 CAPSULE ORAL 3 TIMES DAILY PRN
Status: DISCONTINUED | OUTPATIENT
Start: 2020-06-21 | End: 2020-07-06

## 2020-06-21 RX ORDER — POTASSIUM CHLORIDE 20 MEQ/1
40 TABLET, EXTENDED RELEASE ORAL ONCE
Status: COMPLETED | OUTPATIENT
Start: 2020-06-21 | End: 2020-06-21

## 2020-06-21 NOTE — PROGRESS NOTES
[facility]  Nephrology Progress Note    Dwaine Hunter Patient Status:  Observation    1940 MRN [id number]   [facility] 2NE-A Attending Taryn Velasquez MD   Hosp Day # 3 PCP Conner Vriamontes MD       SUBJECTIVE:  Stab 8.2* 8.1*   MCV 92.9 92.6 92.4 92.2   .0* 119.0* 117.0* 116.0*   INR 1.38*  --   --   --        Recent Labs   Lab 06/17/20  1442 06/18/20  0445 06/19/20  0520 06/20/20  0533 06/21/20  0507   * 132* 132* 132* 132*   K 3.3* 3.2* 3.6 3.4* 3.4* liquid 15 g, 15 g, Oral, Q15 Min PRN    Or  Glucose-Vitamin C (DEX-4) chewable tab 4 tablet, 4 tablet, Oral, Q15 Min PRN    Or  dextrose 50 % injection 50 mL, 50 mL, Intravenous, Q15 Min PRN    Or  glucose (DEX4) oral liquid 30 g, 30 g, Oral, Q15 Min PRN

## 2020-06-21 NOTE — PROGRESS NOTES
· [facility]      Subjective:  Up in chair, no new complaints.    Edema unchanged, no palpations or dyspnea    Objective:  93/58  Afebrile  SR rare v pacing 5 beat NSVT  I/O -1025  BUN/cr 109/3.89 Na 132 K 3.4    Neuro: awake/alert  HEENT: JVD end-stage heart failure. Creatinine is slowly decreasing daily with his baseline chronic kidney disease stage IV. We hoping to avoid facing dialysis. Milrinone was causing hypotension.   Dobutamine was causing nonsustained VT during last admission but no

## 2020-06-21 NOTE — PLAN OF CARE
Pt denies c/o malaise or cardiac symptoms. A&Ox4, forgetful, relieved with rest. Lungs diminished bilaterally with equal expansion, dyspnea with exertion and at rest, 2L NC. Pt NSR on monitor with regular rate, occasional PVC.  Abdomen soft and non-tender w Evaluate effectiveness of vasoactive medications to optimize hemodynamic stability  - Monitor arterial and/or venous puncture sites for bleeding and/or hematoma  - Assess quality of pulses, skin color and temperature  - Assess for signs of decreased corona

## 2020-06-21 NOTE — PROGRESS NOTES
[facility] Hospitalist Progress Note                                                                   [address] [facility]  7/26/1940    SUBJECTIVE:  Pt seen and examined. Sitting in chair.  On bumex lidocaine  10 mL Urethral Once   • tamsulosin HCl  0.4 mg Oral Daily   • clotrimazole-betamethasone   Topical BID   • insulin glargine  14 Units Subcutaneous Nightly   • Heparin Sodium (Porcine)  5,000 Units Subcutaneous Q8H [facility]   • amiodarone HCl  200 mg above  - Wean O2 as tolerated  - rapid COVID testing negative    - Diuresis as note above     # LE cellulitits, resolving; heel wound   - s/p course of IV cefazolin and po keflex last admit  - Wound c/s for LE sores, suspect related to swelling from acute

## 2020-06-22 ENCOUNTER — APPOINTMENT (OUTPATIENT)
Dept: ULTRASOUND IMAGING | Facility: HOSPITAL | Age: 80
DRG: 291 | End: 2020-06-22
Attending: NURSE PRACTITIONER
Payer: MEDICARE

## 2020-06-22 LAB
ANION GAP SERPL CALC-SCNC: 8 MMOL/L (ref 0–18)
BUN BLD-MCNC: 106 MG/DL (ref 7–18)
BUN/CREAT SERPL: 27.1 (ref 10–20)
CALCIUM BLD-MCNC: 8.8 MG/DL (ref 8.5–10.1)
CHLORIDE SERPL-SCNC: 90 MMOL/L (ref 98–112)
CO2 SERPL-SCNC: 33 MMOL/L (ref 21–32)
CREAT BLD-MCNC: 3.91 MG/DL (ref 0.7–1.3)
DEPRECATED RDW RBC AUTO: 76.9 FL (ref 35.1–46.3)
ERYTHROCYTE [DISTWIDTH] IN BLOOD BY AUTOMATED COUNT: 22.7 % (ref 11–15)
GLUCOSE BLD-MCNC: 106 MG/DL (ref 70–99)
GLUCOSE BLD-MCNC: 120 MG/DL (ref 70–99)
GLUCOSE BLD-MCNC: 162 MG/DL (ref 70–99)
GLUCOSE BLD-MCNC: 194 MG/DL (ref 70–99)
GLUCOSE BLD-MCNC: 207 MG/DL (ref 70–99)
HAV IGM SER QL: 2.8 MG/DL (ref 1.6–2.6)
HCT VFR BLD AUTO: 25.3 % (ref 39–53)
HGB BLD-MCNC: 7.9 G/DL (ref 13–17.5)
MCH RBC QN AUTO: 29.5 PG (ref 26–34)
MCHC RBC AUTO-ENTMCNC: 31.2 G/DL (ref 31–37)
MCV RBC AUTO: 94.4 FL (ref 80–100)
OSMOLALITY SERPL CALC.SUM OF ELEC: 307 MOSM/KG (ref 275–295)
PLATELET # BLD AUTO: 99 10(3)UL (ref 150–450)
POTASSIUM SERPL-SCNC: 3.3 MMOL/L (ref 3.5–5.1)
RBC # BLD AUTO: 2.68 X10(6)UL (ref 3.8–5.8)
SODIUM SERPL-SCNC: 131 MMOL/L (ref 136–145)
WBC # BLD AUTO: 4.4 X10(3) UL (ref 4–11)

## 2020-06-22 PROCEDURE — 99233 SBSQ HOSP IP/OBS HIGH 50: CPT | Performed by: INTERNAL MEDICINE

## 2020-06-22 PROCEDURE — 93971 EXTREMITY STUDY: CPT | Performed by: NURSE PRACTITIONER

## 2020-06-22 RX ORDER — POTASSIUM CHLORIDE 20 MEQ/1
40 TABLET, EXTENDED RELEASE ORAL ONCE
Status: COMPLETED | OUTPATIENT
Start: 2020-06-22 | End: 2020-06-22

## 2020-06-22 NOTE — PROGRESS NOTES
[facility]  Cardiology Progress Note    Subjective:  Sitting up in chair. Wife at bedside with lots of questions. Patient states he \"feel's fine\". Still with marked edema up to hips. Seems very weak  Right subclavian CVC catheter site c/d/i.   Te reduced. The     estimated ejection fraction was 15-20%. Severe diffuse hypokinesis with     no identifiable regional variations.  Doppler parameters are consistent     with restrictive physiology, indicative of decreased left ventricular     diastolic comp worsening  · Right heel wound - need to reconsult wound care  · LUE edema, worsening  · Anemia - Hgb 7.9. Bedside RN reports increased nose bleeds  · Thrombocytopenia   · Deconditioning    Plan:    - Continue current dobutamine dosing.   /Ca to 99  Creatinine plateau at 3.9 but hoping to drop further  BUN 90  Potassium 3.3   Sodium 131  Magnesium 2.8  Glucose 120    No significant ventricular ectopy on telemetry.   Blood pressure optimal.  Exam consistent with still fluid overload more on the r

## 2020-06-22 NOTE — CM/SW NOTE
SW called Sam Bowie (Admission coordinator from [facility]). [phone]  This is wife's first choice of [facility].     SHABANA MontalvoW  /Discharge Planner  [phone]

## 2020-06-22 NOTE — PROGRESS NOTES
[facility] Hospitalist Progress Note                                                                   [address] [facility]  7/26/1940    SUBJECTIVE:  Pt seen and examined. Sitting in chair.  On bumex Scheduled:   • lidocaine-prilocaine   Topical Once   • lidocaine  10 mL Urethral Once   • tamsulosin HCl  0.4 mg Oral Daily   • clotrimazole-betamethasone   Topical BID   • insulin glargine  14 Units Subcutaneous Nightly   • Heparin Sodium (Porcine)  5,0 hypoxic respiratory failure secondary to chf above SOB/Hypoxia- on 2L  - Suspected secondary to above  - Wean O2 as tolerated  - rapid COVID testing negative    - Diuresis as note above     # LE cellulitits, resolving; heel wound   - s/p course of IV cefaz

## 2020-06-22 NOTE — CM/SW NOTE
Sw received call from [facility] from [facility].. Wife called overnight requesting informational meeting with Hospice for today. Sw met with pt and wife this am.  Pt familiar to sw from previous admission.   Pt is current with [facility]

## 2020-06-22 NOTE — PROGRESS NOTES
[facility]  Nephrology Progress Note    Dwaine Hunter Patient Status:  Observation    1940 MRN [id number]   [facility] 2NE-A Attending Taryn Velasquez MD   [facility] Day # 4 PCP Conner Viramontes MD       SUBJECTIVE:  No a 06/19/20  0520 06/20/20  0533   WBC 5.8 5.9 5.2 5.0   HGB 9.0* 8.2* 8.2* 8.1*   MCV 92.9 92.6 92.4 92.2   .0* 119.0* 117.0* 116.0*   INR 1.38*  --   --   --        Recent Labs   Lab 06/18/20  0445 06/19/20  0520 06/20/20  0533 06/21/20  0507 06/22/2 Base) MCG/ACT inhaler 1 puff, 1 puff, Inhalation, Q6H PRN  allopurinol (ZYLOPRIM) tab 100 mg, 100 mg, Oral, Daily  cholecalciferol (VITAMIN D3) cap/tab 1,000 Units, 1,000 Units, Oral, QOD  gabapentin (NEURONTIN) cap 100 mg, 100 mg, Oral, Nightly  Meclizine obhx: TOP w/ D&C 2019   gynhx: denies

## 2020-06-22 NOTE — PHYSICAL THERAPY NOTE
PHYSICAL THERAPY EVALUATION - INPATIENT     Room Number: 0918/7202-A  Evaluation Date: 6/22/2020  Type of Evaluation: Initial  Physician Order: PT Eval and Treat    Presenting Problem: CHF  Reason for Therapy: Mobility Dysfunction and Discharge Planning • CABG  2015    Triple CABG Dr. Madai Nixon     • CATARACT     • CEA Bilateral 2000 2002   • COLONOSCOPY     • COLONOSCOPY     • EP DUAL CHAMBER ICD  01/16/2019    [address] [place] [address] dual chamber ICD implant [employer].  Dr. Kim Dinero functional limits     BALANCE  Static Sitting: Good  Dynamic Sitting: Good  Static Standing: Fair -  Dynamic Standing: Poor +         O2 WALK  SPO2 on Room Air at Rest: 99  SPO2 Ambulation on Room Air: 93       MODIFIED YVON DYSPNEA SCALE - (SHORTNESS OF B throughout session. Pt had surgical mask donned when ambulating in the hallway. The pt completed sit>stand to RW with SBA with increased time.   Pt ambulated 60 feet with in the hallway with use of a RW and 1 standing rest break and CGA with verbal cues f impairment may be safe for discharge home, but given the pt's concern about returning home and lack of adequate support, would recommend [facility].   Based on this evaluation, patient's clinical presentation is evolving and overall the evaluation complexity is co

## 2020-06-22 NOTE — CM/SW NOTE
Sw received call from pt's wife. She wants pt referred to [facility] in [place]. Explained that now that pt is on dobutamine, his [facility] options are limited. IDRIS sent referrals for [facility] via [facility] [employer] [address]   In the past, the only [facility] that has accepted dobs was

## 2020-06-22 NOTE — PLAN OF CARE
Pt denies c/o pain, malaise, or cardiac symptoms. A&Ox4, forgetful, relieved with rest. Lungs diminished bilaterally, equal expansion. Pt c/o cough, med given. Pt v-paced/NSR on monitor with occasional PVCs.  Abdomen round and non-tender with active bowel s Assess for signs of decreased coronary artery perfusion - ex.  Angina  - Evaluate fluid balance, assess for edema, trend weights  Outcome: Progressing  Goal: Absence of cardiac arrhythmias or at baseline  Description  INTERVENTIONS:  - Continuous cardiac mo

## 2020-06-22 NOTE — PLAN OF CARE
Received patient at 0730. Alert and Oriented x4, [place]. Tele Rhythm NSR with AV/V pacing at times this AM. Runs of VT this AM asymptomatic, APN cards aware. O2 saturation 93% on room air. Breath sounds diminished. Fall precautions in place.  Spouse at bedside needed  - Instruct patient on self management of diabetes  Outcome: Progressing     Problem: CARDIOVASCULAR - ADULT  Goal: Maintains optimal cardiac output and hemodynamic stability  Description  INTERVENTIONS:  - Monitor vital signs, rhythm, and trends  -

## 2020-06-23 PROBLEM — Z51.5 PALLIATIVE CARE BY SPECIALIST: Status: ACTIVE | Noted: 2020-06-23

## 2020-06-23 PROBLEM — Z71.89 COUNSELING REGARDING ADVANCE CARE PLANNING AND GOALS OF CARE: Status: ACTIVE | Noted: 2020-06-23

## 2020-06-23 LAB
ANION GAP SERPL CALC-SCNC: 7 MMOL/L (ref 0–18)
ATRIAL RATE: 76 BPM
BUN BLD-MCNC: 112 MG/DL (ref 7–18)
BUN/CREAT SERPL: 27.9 (ref 10–20)
CALCIUM BLD-MCNC: 8.9 MG/DL (ref 8.5–10.1)
CHLORIDE SERPL-SCNC: 92 MMOL/L (ref 98–112)
CO2 SERPL-SCNC: 33 MMOL/L (ref 21–32)
CREAT BLD-MCNC: 4.01 MG/DL (ref 0.7–1.3)
GLUCOSE BLD-MCNC: 121 MG/DL (ref 70–99)
GLUCOSE BLD-MCNC: 143 MG/DL (ref 70–99)
GLUCOSE BLD-MCNC: 159 MG/DL (ref 70–99)
GLUCOSE BLD-MCNC: 182 MG/DL (ref 70–99)
GLUCOSE BLD-MCNC: 203 MG/DL (ref 70–99)
OSMOLALITY SERPL CALC.SUM OF ELEC: 311 MOSM/KG (ref 275–295)
POTASSIUM SERPL-SCNC: 3.5 MMOL/L (ref 3.5–5.1)
Q-T INTERVAL: 448 MS
QRS DURATION: 110 MS
QTC CALCULATION (BEZET): 506 MS
R AXIS: 71 DEGREES
SODIUM SERPL-SCNC: 132 MMOL/L (ref 136–145)
T AXIS: 210 DEGREES
VENTRICULAR RATE: 77 BPM

## 2020-06-23 PROCEDURE — 99221 1ST HOSP IP/OBS SF/LOW 40: CPT | Performed by: INTERNAL MEDICINE

## 2020-06-23 PROCEDURE — 99233 SBSQ HOSP IP/OBS HIGH 50: CPT | Performed by: INTERNAL MEDICINE

## 2020-06-23 PROCEDURE — 99232 SBSQ HOSP IP/OBS MODERATE 35: CPT | Performed by: INTERNAL MEDICINE

## 2020-06-23 RX ORDER — POTASSIUM CHLORIDE 20 MEQ/1
40 TABLET, EXTENDED RELEASE ORAL ONCE
Status: COMPLETED | OUTPATIENT
Start: 2020-06-23 | End: 2020-06-23

## 2020-06-23 RX ORDER — SODIUM CHLORIDE/ALOE VERA
GEL (GRAM) NASAL AS NEEDED
Status: DISCONTINUED | OUTPATIENT
Start: 2020-06-23 | End: 2020-07-06

## 2020-06-23 NOTE — PLAN OF CARE
Assumed pt care at [address] [facility]. A&Ox4. RA,  satting in low 90s, denies chest pain/SOB, lung sounds diminished, VSS, NSR w intermittent v pacing on tele. Up with 1/walker.  POC bumex/dobs gtt, dw, wound care, POC updated with pt, questions answered, verbalized u monitoring, monitor vital signs, obtain 12 lead EKG if indicated  - Evaluate effectiveness of antiarrhythmic and heart rate control medications as ordered  - Initiate emergency measures for life threatening arrhythmias  - Monitor electrolytes and administe

## 2020-06-23 NOTE — PROGRESS NOTES
[facility]  Nephrology Progress Note    Pranav Tim Patient Status:  Observation    1940 MRN [id number]   [facility] 2NE-A Attending Gwyn Lagunas MD   [facility] Day # 5 PCP Matt Jasso MD       SUBJECTIVE:  No a Labs   Lab 06/17/20  1442 06/18/20  0449 06/19/20  0520 06/20/20  0533 06/22/20  0626   WBC 5.8 5.9 5.2 5.0 4.4   HGB 9.0* 8.2* 8.2* 8.1* 7.9*   MCV 92.9 92.6 92.4 92.2 94.4   .0* 119.0* 117.0* 116.0* 99.0*   INR 1.38*  --   --   --   --        Rece tab 100 mg, 100 mg, Oral, Daily  cholecalciferol (VITAMIN D3) cap/tab 1,000 Units, 1,000 Units, Oral, QOD  gabapentin (NEURONTIN) cap 100 mg, 100 mg, Oral, Nightly  Meclizine HCl (ANTIVERT) tab 25 mg, 25 mg, Oral, TID PRN  glucose (DEX4) oral liquid 15 g,

## 2020-06-23 NOTE — CM/SW NOTE
IDRIS spoke to Lewis at [address] [facility]: [phone] about the referral. She stated that she is not linked up with [facility], and was unable to review the referral until it was recently faxed over.      IDRIS explained that pt is going to require IV dobutamine, a

## 2020-06-23 NOTE — PROGRESS NOTES
[facility] Hospitalist Progress Note                                                                   [address] [facility]  7/26/1940    SUBJECTIVE:  Pt seen and examined. Napping in bed.  SBP  • lidocaine  10 mL Urethral Once   • tamsulosin HCl  0.4 mg Oral Daily   • clotrimazole-betamethasone   Topical BID   • insulin glargine  14 Units Subcutaneous Nightly   • Heparin Sodium (Porcine)  5,000 Units Subcutaneous Q8H [address]   • amiodarone HCl  200 renally dose meds  - appreciate renal recs     #Acute hypoxic respiratory failure secondary to chf above SOB/Hypoxia- on 2L  - Suspected secondary to above  - Wean O2 as tolerated  - rapid COVID testing negative    - Diuresis as note above     # LE celluli

## 2020-06-23 NOTE — CONSULTS
[address]  [id number]  Hospital Day #5  Date of Consult: 06/23/20  Patient seen at: [facility] Room #[id number]    Reason for Consultation:      Consult requested by Dr Debby Adams for evaluation of CABG  2015    Triple CABG Dr. Isael Sood     • CATARACT     • CEA Bilateral 2000 2002   • COLONOSCOPY     • COLONOSCOPY     • EP DUAL CHAMBER ICD  01/16/2019    [address] [place] [address] dual chamber ICD implant [employer].  Dr. Minnie Chaudhary cap 0.4 mg, 0.4 mg, Oral, Daily  •  clotrimazole-betamethasone (LOTRISONE) cream, , Topical, BID  •  insulin glargine (LANTUS Solostar) 100 UNIT/ML injection 14 units, 14 Units, Subcutaneous, Nightly  •  bumetanide (BUMEX) 12.5 mg in sodium chloride 0.9% 1  (L) 06/23/2020    K 3.5 06/23/2020    CL 92 (L) 06/23/2020    CO2 33.0 (H) 06/23/2020     (H) 06/23/2020    CA 8.9 06/23/2020    ALB 2.8 (L) 06/18/2020    ALKPHO 96 06/18/2020    BILT 1.0 06/18/2020    TP 6.9 06/18/2020    AST 27 06/18/2020 Reduced Full or confused   50 Mainly sit/lie Extensive Disease  Can’t do any work   Partial Assist Normal or Reduced Full or confused   40 Mainly in bed Extensive Disease  Can’t do any work Mainly Assist Normal or Reduced Full or confused   30 Bedbound [address] Agent's Phone Number: [phone]     Spiritual needs addressed: Unable to assess  Disposition: SNF palliative care    I provided the Palliative Care Brochure and my contact information.      Problem List       Chronic combined systolic

## 2020-06-23 NOTE — CM/SW NOTE
SW provided pt and his spouse, Alisha Robertson, with the [place] [id number] list of accepting facilities from [address] [place] [address]. [facility], and [facility] are the only facilities are able to accept pt for ISAIAH and for the IV dobumatine.      Pt and spouse expresse

## 2020-06-23 NOTE — PLAN OF CARE
Received patient at 0730. Alert and Oriented x4, [place]. Tele Rhythm NSR with V pacing at times, EKG completed per order. O2 saturation 93-95% on room air. Breath sounds diminished. Productive cough, small amounts of sputum, tessalon PRN.  Fall precautions in adequate nutritional intake and initiate nutrition consult as needed  - Instruct patient on self management of diabetes  Outcome: Progressing     Problem: CARDIOVASCULAR - ADULT  Goal: Maintains optimal cardiac output and hemodynamic stability  Description promote bladder emptying  Outcome: Progressing

## 2020-06-24 LAB
ANION GAP SERPL CALC-SCNC: 8 MMOL/L (ref 0–18)
BUN BLD-MCNC: 113 MG/DL (ref 7–18)
BUN/CREAT SERPL: 26.9 (ref 10–20)
CALCIUM BLD-MCNC: 8.6 MG/DL (ref 8.5–10.1)
CHLORIDE SERPL-SCNC: 91 MMOL/L (ref 98–112)
CO2 SERPL-SCNC: 32 MMOL/L (ref 21–32)
CREAT BLD-MCNC: 4.2 MG/DL (ref 0.7–1.3)
GLUCOSE BLD-MCNC: 112 MG/DL (ref 70–99)
GLUCOSE BLD-MCNC: 136 MG/DL (ref 70–99)
GLUCOSE BLD-MCNC: 162 MG/DL (ref 70–99)
GLUCOSE BLD-MCNC: 179 MG/DL (ref 70–99)
GLUCOSE BLD-MCNC: 186 MG/DL (ref 70–99)
OSMOLALITY SERPL CALC.SUM OF ELEC: 309 MOSM/KG (ref 275–295)
POTASSIUM SERPL-SCNC: 3.4 MMOL/L (ref 3.5–5.1)
SODIUM SERPL-SCNC: 131 MMOL/L (ref 136–145)

## 2020-06-24 PROCEDURE — 99232 SBSQ HOSP IP/OBS MODERATE 35: CPT | Performed by: INTERNAL MEDICINE

## 2020-06-24 PROCEDURE — 99233 SBSQ HOSP IP/OBS HIGH 50: CPT | Performed by: INTERNAL MEDICINE

## 2020-06-24 RX ORDER — POTASSIUM CHLORIDE 20 MEQ/1
40 TABLET, EXTENDED RELEASE ORAL ONCE
Status: COMPLETED | OUTPATIENT
Start: 2020-06-24 | End: 2020-06-24

## 2020-06-24 NOTE — PROGRESS NOTES
[facility]  Nephrology Progress Note    Kaycee Mcmahan Patient Status:  Observation    1940 MRN [id number]   [facility] 2NE-A Attending Lj Britton MD   [facility] Day # 6 PCP Marques Solitario MD       SUBJECTIVE:  Feel rash        Labs:     Recent Labs   Lab 06/17/20  1442 06/18/20  0449 06/19/20  0520 06/20/20  0533 06/22/20  0626   WBC 5.8 5.9 5.2 5.0 4.4   HGB 9.0* 8.2* 8.2* 8.1* 7.9*   MCV 92.9 92.6 92.4 92.2 94.4   .0* 119.0* 117.0* 116.0* 99.0*   INR 1.38* mg, 200 mg, Oral, Daily  aspirin EC tab 81 mg, 81 mg, Oral, Daily  hydrALAzine HCl (APRESOLINE) tab 25 mg, 25 mg, Oral, Q8H DIAMOND  isosorbide dinitrate (ISORDIL TITRADOSE) tab 5 mg, 5 mg, Oral, Q8H  Albuterol Sulfate  (90 Base) MCG/ACT inhaler 1 puff, of CKD. receved venofer for Fe def last week as well    #5. Hypokalemia- replace and follow        Questions/concerns were discussed with patient and/or family by bedside.         Rachael Coppola MD  6/24/2020  9:45 AM

## 2020-06-24 NOTE — PROGRESS NOTES
[facility] Hospitalist Progress Note                                                                   [address] [facility]  7/26/1940    SUBJECTIVE:  Pt seen and examined.   Denies feeling SOB, still clotrimazole-betamethasone   Topical BID   • insulin glargine  14 Units Subcutaneous Nightly   • Heparin Sodium (Porcine)  5,000 Units Subcutaneous Q8H [address]   • amiodarone HCl  200 mg Oral Daily   • aspirin  81 mg Oral Daily   • hydrALAzine HCl  25 mg Oral hypoxic respiratory failure secondary to chf above SOB/Hypoxia- on 2L  - Suspected secondary to above  - Wean O2 as tolerated  - rapid COVID testing negative    - Diuresis as note above     # LE cellulitits, resolving; heel wound   - s/p course of IV cefaz

## 2020-06-24 NOTE — PROGRESS NOTES
[address] [facility] 149 Follow Up    Tennille Connor  [id number]  Hospital Day #6  Date of Consult: 06/23/20  Patient seen at: [facility] Room #4243    Subjective:      Patient was seen and examined with no one else at the bedside.    Calin Plascencia Current Facility-Administered Medications:   •  benzocaine-menthol (CEPACOL (SUGAR-FREE)) 1 lozenge, 1 lozenge, Oral, PRN  •  bumetanide (BUMEX) 12.5 mg in sodium chloride 0.9% 100 mL infusion, 1 mg/hr, Intravenous, Continuous  •  ayr saline nasal ge file.      Labs/ imaging     Hematology:  Lab Results   Component Value Date    WBC 4.4 06/22/2020    HGB 7.9 (L) 06/22/2020    HCT 25.3 (L) 06/22/2020    PLT 99.0 (L) 06/22/2020       Coags:  Lab Results   Component Value Date    INR 1.38 (H) 06/17/2020 Disease  Can’t do any work Mainly Assist Normal or Reduced Full or confused   30 Bedbound Extensive Disease  Can’t do any work Max Assist  Total Care Reduced Drowsy/confused   20 Bedbound Extensive Disease  Can’t do any work Max Assist  Total Care Minimal No new medications from our palliative care services    4. Psychosocial: Emotional support provided. 5. Disposition: TBD    6.  Discussed today’s visit with his floor RN    A total of 25 minutes were spent on this consult; which included all of the fol

## 2020-06-24 NOTE — PROGRESS NOTES
[facility]  Cardiology Progress Note    Kerline Pair Patient Status:  Inpatient    1940 MRN [id number]   [facility] 2NE-A Attending Jeremy Lamar MD   [facility] Day # 6 PCP Fatou Colin MD     A/P:  Acute on chronic 240 lb 12.8 oz (109.2 kg)  06/08/20 0628 : 247 lb 5.7 oz (112.2 kg)  06/07/20 0346 : 248 lb 14.4 oz (112.9 kg)  06/06/20 0459 : 251 lb 6.4 oz (114 kg)  06/05/20 1718 : 245 lb (111.1 kg)      Allergies:    Levemir                 SWELLING  Lovaza Continuous  Heparin Sodium (Porcine) 5000 UNIT/ML injection 5,000 Units, 5,000 Units, Subcutaneous, Q8H DIAMOND  acetaminophen (TYLENOL) tab 650 mg, 650 mg, Oral, Q6H PRN  amiodarone HCl (PACERONE) tab 200 mg, 200 mg, Oral, Daily  aspirin EC tab 81 mg, 81 mg, cardioverter-defibrillator) in place     Primary insomnia     Paroxysmal atrial fibrillation (HCC)     Thrombocytopenia, unspecified (HCC)     Acute on chronic systolic CHF (congestive heart failure), NYHA class 3 (HCC)     CKD (chronic kidney disease) sta

## 2020-06-24 NOTE — PROGRESS NOTES
06/24/20 0252 06/24/20 0253 06/24/20 0254   Oxygen Therapy   SpO2 91 % (!) 87 % (!) 87 %      06/24/20 0255 06/24/20 0256 06/24/20 0257   Oxygen Therapy   SpO2 (!) 89 % (!) 85 % (!) 84 %      06/24/20 0258 06/24/20 0259 06/24/20 0300   Oxygen Therapy

## 2020-06-24 NOTE — DIETARY NOTE
[address] [facility]     Admitting diagnosis:  Acute kidney injury ([facility] [address] [N17.9]  Hypervolemia, unspecified hypervolemia type [E87.70]    Ht: 177.8 cm (5' 10\")  Wt: 110.9 kg (244 lb 7.8 oz).  This is 148 % of IBW  Body nutrition issues arise.     Reji Breen MS, RD, LDN  Clinical Dietitian  Pager# [phone]

## 2020-06-24 NOTE — PLAN OF CARE
Assumed pt care at [address] [facility]. A&Ox4. RA,  satting in low 90s, denies chest pain/SOB, lung sounds diminished, VSS, NSR w intermittent v pacing on tele. Up with 1/walker.  POC bumex/dobs gtt, dw, wound care, POC updated with pt, questions answered, verbalized u decreased coronary artery perfusion - ex.  Angina  - Evaluate fluid balance, assess for edema, trend weights  Outcome: Progressing  Goal: Absence of cardiac arrhythmias or at baseline  Description  INTERVENTIONS:  - Continuous cardiac monitoring, monitor vi

## 2020-06-24 NOTE — CM/SW NOTE
nataliia spoke to steven [facility], she has not yet made a determination on the referral and will call this worker back asap with a decision. She is still not sure if they can even accommodate dobutamine.      Celena Parikh, [facility], and [place]

## 2020-06-24 NOTE — PLAN OF CARE
Assumed care this am. Pt on Dobutamine and Bumex drips. SBP running 90's. Asymptomatic. NSR on tele. Left arm swelling improving. Negative for DVT. Sats wnl on RA. Voiding per urinal. May need HD in future. Fall precautions in place. Cont drips as ordered.

## 2020-06-25 LAB
ANION GAP SERPL CALC-SCNC: 9 MMOL/L (ref 0–18)
BUN BLD-MCNC: 104 MG/DL (ref 7–18)
BUN/CREAT SERPL: 25.2 (ref 10–20)
CALCIUM BLD-MCNC: 8.7 MG/DL (ref 8.5–10.1)
CHLORIDE SERPL-SCNC: 91 MMOL/L (ref 98–112)
CO2 SERPL-SCNC: 32 MMOL/L (ref 21–32)
CREAT BLD-MCNC: 4.12 MG/DL (ref 0.7–1.3)
GLUCOSE BLD-MCNC: 110 MG/DL (ref 70–99)
GLUCOSE BLD-MCNC: 128 MG/DL (ref 70–99)
GLUCOSE BLD-MCNC: 195 MG/DL (ref 70–99)
GLUCOSE BLD-MCNC: 226 MG/DL (ref 70–99)
GLUCOSE BLD-MCNC: 257 MG/DL (ref 70–99)
OSMOLALITY SERPL CALC.SUM OF ELEC: 307 MOSM/KG (ref 275–295)
POTASSIUM SERPL-SCNC: 3.4 MMOL/L (ref 3.5–5.1)
SODIUM SERPL-SCNC: 132 MMOL/L (ref 136–145)

## 2020-06-25 PROCEDURE — 99233 SBSQ HOSP IP/OBS HIGH 50: CPT | Performed by: CLINICAL NURSE SPECIALIST

## 2020-06-25 PROCEDURE — 99232 SBSQ HOSP IP/OBS MODERATE 35: CPT | Performed by: INTERNAL MEDICINE

## 2020-06-25 PROCEDURE — 99233 SBSQ HOSP IP/OBS HIGH 50: CPT | Performed by: INTERNAL MEDICINE

## 2020-06-25 NOTE — PLAN OF CARE
ALERT AND ORIENTED X4 ON TELE MONITOR HR 90'S SINUS RHYTHM. BUMEX GTT AT 1 MG/HR AND DOBUTAMINE GTT AT 5 MCG/KG/MIN AT 17 CC/HR IN PROGRESS PATENT AND INTACT. UPDATED W/ POC AND VERBALIZED UNDERSTANDING. VOID PER URINAL.  ALL NEEDS ATTENDED AND WILL CONTINU monitoring, monitor vital signs, obtain 12 lead EKG if indicated  - Evaluate effectiveness of antiarrhythmic and heart rate control medications as ordered  - Initiate emergency measures for life threatening arrhythmias  - Monitor electrolytes and administe

## 2020-06-25 NOTE — PROGRESS NOTES
[facility]  Cardiology Progress Note    Subjective:  Tells me he is slowly feeling better everyday. Continues to have significant edema.     Objective:  /52 (BP Location: Right arm)   Pulse 91   Temp 97.6 °F (36.4 °C) (Oral)   Resp 18   Ht 5' 10 decreased left ventricular     diastolic compliance and/or increased left atrial pressure. 2. Aortic valve: Trileaflet; mildly thickened leaflets. Transvalvular     velocity was within the normal range. There was no stenosis. Trivial     regurgitation.   3 in a.m.  - Check iron saturation in a.m.  - Continue dobutamine  - Diuretics per nephrology.   As outlined by Dr. Ike Person, may ultimately require HD.  - Difficult social situation - Accepted at [facility] rehab with dobutamine infusion but wife emily take care of him at home.     [address] [place] [address]  June 25, 2020

## 2020-06-25 NOTE — PROGRESS NOTES
[facility]  Nephrology Progress Note    Pat Cheek Patient Status:  Observation    1940 MRN [id number]   [facility] 2NE-A Attending Marthenia Goodell, MD   [facility] Day # 7 PCP Mdaina Yu MD       SUBJECTIVE:  Feel wrapped bilaterally, 2+ dependent thigh edema  Neurologic: moving all extremities  Skin: Warm and dry, no rash        Labs:     Recent Labs   Lab 06/19/20  0520 06/20/20  0533 06/22/20  0626   WBC 5.2 5.0 4.4   HGB 8.2* 8.1* 7.9*   MCV 92.4 92.2 94.4   PLT [address]  isosorbide dinitrate (ISORDIL TITRADOSE) tab 5 mg, 5 mg, Oral, Q8H  Albuterol Sulfate  (90 Base) MCG/ACT inhaler 1 puff, 1 puff, Inhalation, Q6H PRN  allopurinol (ZYLOPRIM) tab 100 mg, 100 mg, Oral, Daily  cholecalciferol (VITAMIN D3) cap/tab 1

## 2020-06-25 NOTE — PLAN OF CARE
Received patient at 0730. Alert and Oriented x4, forgetful at times, wife at bedside. Tele Rhythm NSR with BBB, occasional . O2 saturation 96% On room air. Breath sounds diminished, SOB with exertion. Bed is locked and in low position.  Call light and pers Assess quality of pulses, skin color and temperature  - Assess for signs of decreased coronary artery perfusion - ex.  Angina  - Evaluate fluid balance, assess for edema, trend weights  Outcome: Progressing  Goal: Absence of cardiac arrhythmias or at baseli

## 2020-06-25 NOTE — PROGRESS NOTES
06/24/20 5651   Clinical Encounter Type   Visited With Patient   Routine Visit Introduction  (RN reported pt is decisional)   Continue Visiting No  (AD requested and completed.   Original to pt and copy to chart)   Patient's Supportive Strategies/Resourc

## 2020-06-25 NOTE — PROGRESS NOTES
[address] Follow Up    Pat Cheek  [id number]  Patient seen at: [address]:      \"Our family motto is never give up\"    Patient seen and evaluated, family at bedside.      Review of Systems:   Jael Carmichael TITRADOSE) tab 5 mg, 5 mg, Oral, Q8H  •  Albuterol Sulfate  (90 Base) MCG/ACT inhaler 1 puff, 1 puff, Inhalation, Q6H PRN  •  allopurinol (ZYLOPRIM) tab 100 mg, 100 mg, Oral, Daily  •  cholecalciferol (VITAMIN D3) cap/tab 1,000 Units, 1,000 Units, O structures, Doppler spectral analysis, and color flow. Doppler imaging were performed. The   following veins were imaged:  Subclavian, Jugular, Axillary, Brachial, Basilic, Cephalic, and the contralateral Subclavian and Jugular.      PATIENT STATED HISTOR Disease  Can't do any work   Partial Assist Normal or Reduced Full or confused   40 Mainly in bed Extensive Disease  Can't do any work Mainly Assist Normal or Reduced Full or confused   30 Bedbound Extensive Disease  Can't do any work Max Assist  Total Car Agent Appointed: Yes  Healthcare Agent's Name: Jose A [facility] Hetal Jonas Agent's Phone Number: [phone]       Disposition: SNF palliative care most likely but patient and spouse are still hoping they can go home.      Problem List   Patient Active Probl Cardiorenal syndrome     Volume overload     Counseling regarding advance care planning and goals of care     Palliative care by specialist      Palliative Care Recommendations/Plan:     1.  Goals of care: Patient and spouse have discussed HD possibility an

## 2020-06-25 NOTE — PROGRESS NOTES
[facility] Hospitalist Progress Note                                                                   [address] [facility]  7/26/1940    SUBJECTIVE:  Pt seen and examined.   Denies feeling SOB, states Once   • lidocaine  10 mL Urethral Once   • tamsulosin HCl  0.4 mg Oral Daily   • clotrimazole-betamethasone   Topical BID   • insulin glargine  14 Units Subcutaneous Nightly   • Heparin Sodium (Porcine)  5,000 Units Subcutaneous Q8H [facility]   • amiodarone HCl likely cardiorenal from volume overload  - monitor I and O and weights  - renally dose meds  - appreciate renal recs  - HD being considered      #Acute hypoxic respiratory failure secondary to chf above SOB/Hypoxia- on 2L  - Suspected secondary to above  -

## 2020-06-25 NOTE — WOUND PROGRESS NOTE
[facility]  Report of Inpatient Wound Care Progress Note    Esther Koo Patient Status:  Inpatient    1940 MRN [id number]   [facility] 2NE-A Attending Cindy Sebastian,    Hosp Day # 7 PCP MD Mike Clark [facility]   • TONSILLECTOMY     • TOTAL KNEE REPLACEMENT       Social History    Tobacco Use      Smoking status: Former Smoker        Packs/day: 2.50        Years: 42.00        Pack years: 105        Types: Cigarettes        Start date: 8/4/1957 Wound Goals still in progress:  1. Maintain optimal wound healing environment  2. Remove wound bioburden  3. Decrease periwound edema      PLAN OF CARE:   Border foam to R heel to be changed every 48 hours.      WOUND CARE DISCHARGE RECOMMENDATIONS:   Noted

## 2020-06-26 PROBLEM — D63.1 ANEMIA IN STAGE 4 CHRONIC KIDNEY DISEASE (HCC): Status: ACTIVE | Noted: 2020-06-05

## 2020-06-26 PROBLEM — N18.4 ANEMIA IN STAGE 4 CHRONIC KIDNEY DISEASE (HCC): Status: ACTIVE | Noted: 2020-06-05

## 2020-06-26 LAB
ANION GAP SERPL CALC-SCNC: 9 MMOL/L (ref 0–18)
BASOPHILS # BLD AUTO: 0.02 X10(3) UL (ref 0–0.2)
BASOPHILS NFR BLD AUTO: 0.4 %
BUN BLD-MCNC: 110 MG/DL (ref 7–18)
BUN/CREAT SERPL: 27.2 (ref 10–20)
CALCIUM BLD-MCNC: 8.6 MG/DL (ref 8.5–10.1)
CHLORIDE SERPL-SCNC: 91 MMOL/L (ref 98–112)
CO2 SERPL-SCNC: 32 MMOL/L (ref 21–32)
CREAT BLD-MCNC: 4.04 MG/DL (ref 0.7–1.3)
DEPRECATED RDW RBC AUTO: 76.6 FL (ref 35.1–46.3)
EOSINOPHIL # BLD AUTO: 0.19 X10(3) UL (ref 0–0.7)
EOSINOPHIL NFR BLD AUTO: 4.2 %
ERYTHROCYTE [DISTWIDTH] IN BLOOD BY AUTOMATED COUNT: 22.8 % (ref 11–15)
GLUCOSE BLD-MCNC: 113 MG/DL (ref 70–99)
GLUCOSE BLD-MCNC: 118 MG/DL (ref 70–99)
GLUCOSE BLD-MCNC: 166 MG/DL (ref 70–99)
GLUCOSE BLD-MCNC: 198 MG/DL (ref 70–99)
GLUCOSE BLD-MCNC: 198 MG/DL (ref 70–99)
HCT VFR BLD AUTO: 25.1 % (ref 39–53)
HGB BLD-MCNC: 7.9 G/DL (ref 13–17.5)
IMM GRANULOCYTES # BLD AUTO: 0.05 X10(3) UL (ref 0–1)
IMM GRANULOCYTES NFR BLD: 1.1 %
IRON SATURATION: 9 % (ref 20–50)
IRON SERPL-MCNC: 36 UG/DL (ref 65–175)
LYMPHOCYTES # BLD AUTO: 0.41 X10(3) UL (ref 1–4)
LYMPHOCYTES NFR BLD AUTO: 9 %
MCH RBC QN AUTO: 29.7 PG (ref 26–34)
MCHC RBC AUTO-ENTMCNC: 31.5 G/DL (ref 31–37)
MCV RBC AUTO: 94.4 FL (ref 80–100)
MONOCYTES # BLD AUTO: 0.59 X10(3) UL (ref 0.1–1)
MONOCYTES NFR BLD AUTO: 12.9 %
NEUTROPHILS # BLD AUTO: 3.3 X10 (3) UL (ref 1.5–7.7)
NEUTROPHILS # BLD AUTO: 3.3 X10(3) UL (ref 1.5–7.7)
NEUTROPHILS NFR BLD AUTO: 72.4 %
OSMOLALITY SERPL CALC.SUM OF ELEC: 310 MOSM/KG (ref 275–295)
PLATELET # BLD AUTO: 90 10(3)UL (ref 150–450)
PLATELET MORPHOLOGY: NORMAL
POTASSIUM SERPL-SCNC: 3.2 MMOL/L (ref 3.5–5.1)
RBC # BLD AUTO: 2.66 X10(6)UL (ref 3.8–5.8)
SODIUM SERPL-SCNC: 132 MMOL/L (ref 136–145)
TOTAL IRON BINDING CAPACITY: 393 UG/DL (ref 240–450)
TRANSFERRIN SERPL-MCNC: 264 MG/DL (ref 200–360)
WBC # BLD AUTO: 4.6 X10(3) UL (ref 4–11)

## 2020-06-26 PROCEDURE — 99233 SBSQ HOSP IP/OBS HIGH 50: CPT | Performed by: INTERNAL MEDICINE

## 2020-06-26 RX ORDER — DOCUSATE SODIUM 100 MG/1
100 CAPSULE, LIQUID FILLED ORAL 2 TIMES DAILY
Status: DISCONTINUED | OUTPATIENT
Start: 2020-06-26 | End: 2020-07-06

## 2020-06-26 NOTE — PROGRESS NOTES
[facility]  Nephrology Progress Note    Gifty Shaffer Patient Status:  Observation    1940 MRN [id number]   [facility] 2NE-A Attending Jake Meeks MD   [facility] Day # 8 PCP Amanda Reich MD       SUBJECTIVE:  Feel sounds  Extremities: Lower legs are wrapped bilaterally, 2+ dependent thigh edema  Neurologic: moving all extremities  Skin: Warm and dry, no rash        Labs:     Recent Labs   Lab 06/20/20  0533 06/22/20  0626 06/26/20  0608   WBC 5.0 4.4 4.6   HGB 8.1* tab 81 mg, 81 mg, Oral, Daily  hydrALAzine HCl (APRESOLINE) tab 25 mg, 25 mg, Oral, Q8H DIAMOND  isosorbide dinitrate (ISORDIL TITRADOSE) tab 5 mg, 5 mg, Oral, Q8H  Albuterol Sulfate  (90 Base) MCG/ACT inhaler 1 puff, 1 puff, Inhalation, Q6H PRN  allopu Jory Puckett MD  6/26/2020  1:55 PM

## 2020-06-26 NOTE — PROGRESS NOTES
· [facility]      Subjective:  Up in chair, no new complaints, leg edema improving, left arm edema unchanged but resolves when in bed when he awakens    Objective:  95/52 afebrile  SR rare PVC I/O -220    Hgb 7.9  plt 90  BUN/cr 110/4.04    Ne of breath at rest.  Mild shortness of breath on exertion when he walks in the room or hallway with walker. He is ambulating more every day. No chest pain.   No significant ventricular ectopy with dobutamine drip this time which is great since we can send h till Monday. Push physical therapy. He started to walk more with a walker in hallway. Distally he may have a chance to go home with home PT home health and visiting nurse.  Otherwise will go to subacute rehab for strengthening since wife may not be a

## 2020-06-26 NOTE — PHYSICAL THERAPY NOTE
PHYSICAL THERAPY TREATMENT NOTE - INPATIENT    Room Number: 5113/7066-V     Session: 1   Number of Visits to Meet Established Goals: 5    Presenting Problem: CHF  History related to current admission: Pt is [de-identified]year old male who presented to the ED from he chamber ICD implant [employer].  Dr. Danielito Mejias   • [address] N/A 7/8/2015    Performed by Cait Smith MD at [address] (EXTERNAL)  12/09/2019    PTA of the chronic occluded strength, forward flexed, dec speed, )  Stoop/Curb Assistance: Not tested       Skilled Therapy Provided: The writer had droplet mask and gloves on during session. Pt was masked for out of room mobility. RN approved session.  Pt reported of being fatigu Research supports that patients with this level of impairment may benefit from ISAIAH. DISCHARGE RECOMMENDATIONS  PT Discharge Recommendations: Sub-acute rehabilitation(ELOS 12-14 days)     PLAN  PT Treatment Plan: Bed mobility; Endurance; Energy conservatio

## 2020-06-26 NOTE — PROGRESS NOTES
[facility] Hospitalist Progress Note                                                                   [address] [facility]  7/26/1940    SUBJECTIVE:  Pt seen and examined.   Denies feeling SOB, states Meds:   Scheduled:   • docusate sodium  100 mg Oral BID   • potassium chloride  40 mEq Intravenous Once   • Chlorothiazide (DIURIL) IV Push  500 mg Intravenous Once   • epoetin nina-epbx  20,000 Units Subcutaneous Weekly   • lidocaine-prilocaine   To SW for home set-up  -no BB while on   -s/p IR tunneled catheter 6/10  -s/p ICD interrogation   -HD being considered in the future      # SVETLANA on CKD IV, Cardiorenal syndrome   - Hx CHD stage IV, cr baseline around 2.2-per renal, may need to tolerate high inpt care. Plan ISAIAH vs home with home care that will accpet  infusion    Plan of care d/w pt and wife who agree.  Lola Bedoya DO  [facility] Hospitalist  [phone]

## 2020-06-26 NOTE — PLAN OF CARE
Pt denies c/o pain,malaise, or cardiac symptoms. A&Ox4. Lungs diminished bilaterally with equal expansion, 2L O2 NC at night. Pt NSR with occasional v-paces on monitor, regular rate. Abdomen soft and round with active bowel sounds in all four quadrants.  Pt Evaluate fluid balance, assess for edema, trend weights  Outcome: Progressing  Goal: Absence of cardiac arrhythmias or at baseline  Description  INTERVENTIONS:  - Continuous cardiac monitoring, monitor vital signs, obtain 12 lead EKG if indicated  - Evalua

## 2020-06-27 LAB
ANION GAP SERPL CALC-SCNC: 9 MMOL/L (ref 0–18)
BUN BLD-MCNC: 109 MG/DL (ref 7–18)
BUN/CREAT SERPL: 27.4 (ref 10–20)
CALCIUM BLD-MCNC: 8.7 MG/DL (ref 8.5–10.1)
CHLORIDE SERPL-SCNC: 92 MMOL/L (ref 98–112)
CO2 SERPL-SCNC: 31 MMOL/L (ref 21–32)
CREAT BLD-MCNC: 3.98 MG/DL (ref 0.7–1.3)
GLUCOSE BLD-MCNC: 106 MG/DL (ref 70–99)
GLUCOSE BLD-MCNC: 122 MG/DL (ref 70–99)
GLUCOSE BLD-MCNC: 123 MG/DL (ref 70–99)
GLUCOSE BLD-MCNC: 154 MG/DL (ref 70–99)
GLUCOSE BLD-MCNC: 172 MG/DL (ref 70–99)
OSMOLALITY SERPL CALC.SUM OF ELEC: 309 MOSM/KG (ref 275–295)
POTASSIUM SERPL-SCNC: 3.3 MMOL/L (ref 3.5–5.1)
SODIUM SERPL-SCNC: 132 MMOL/L (ref 136–145)

## 2020-06-27 PROCEDURE — 99233 SBSQ HOSP IP/OBS HIGH 50: CPT | Performed by: INTERNAL MEDICINE

## 2020-06-27 PROCEDURE — 99232 SBSQ HOSP IP/OBS MODERATE 35: CPT | Performed by: NURSE PRACTITIONER

## 2020-06-27 RX ORDER — MINERAL OIL AND PETROLATUM 150; 830 MG/G; MG/G
OINTMENT OPHTHALMIC 3 TIMES DAILY
Status: DISCONTINUED | OUTPATIENT
Start: 2020-06-27 | End: 2020-07-06

## 2020-06-27 RX ORDER — POTASSIUM CHLORIDE 20 MEQ/1
40 TABLET, EXTENDED RELEASE ORAL ONCE
Status: COMPLETED | OUTPATIENT
Start: 2020-06-27 | End: 2020-06-27

## 2020-06-27 NOTE — PLAN OF CARE
Patient unable to void ;Dr. Deepali Kennedy notified;bladder scan over 500 straight cath w/o difficulty  Urology consult- Dr. Mary Anne Camacho notified  mohan catheter inserted aseptically  Routine gaviota and mohan cath care  Sinus rhythm or v pacing   Alegria sl improve per patie Evaluate fluid balance, assess for edema, trend weights  Outcome: Progressing     Problem: Diabetes/Glucose Control  Goal: Glucose maintained within prescribed range  Description  INTERVENTIONS:  - Monitor Blood Glucose as ordered  - Assess for signs and s

## 2020-06-27 NOTE — PLAN OF CARE
Pt denies c/o malaise or cardiac symptoms. A&Ox4. Lungs diminished bilaterally with equal expansion, dyspnea with exertion, 2L O2 NC at night. Pt NSR with occasional v-paces. Abdomen round and distended with active bowel sounds in all four quadrants.  Pt c/ sites for bleeding and/or hematoma  - Assess quality of pulses, skin color and temperature  - Assess for signs of decreased coronary artery perfusion - ex.  Angina  - Evaluate fluid balance, assess for edema, trend weights  Outcome: Progressing  Goal: Absen

## 2020-06-27 NOTE — PROGRESS NOTES
[facility]  Nephrology Progress Note    Evelina Lopez Patient Status:  Observation    1940 MRN [id number]   [facility] 2NE-A Attending Brigitte Colby MD   [facility] Day # 9 PCP Annemarie Escalera MD       SUBJECTIVE:  No c non-tender. + Mildly distended, + bowel sounds  Extremities: Lower legs are wrapped bilaterally, 2+ dependent thigh edema  Neurologic: moving all extremities  Skin: Warm and dry, no rash        Labs:     Recent Labs   Lab 06/22/20  0626 06/26/20  0608   WB Continuous  Heparin Sodium (Porcine) 5000 UNIT/ML injection 5,000 Units, 5,000 Units, Subcutaneous, Q8H DIAMOND  acetaminophen (TYLENOL) tab 650 mg, 650 mg, Oral, Q6H PRN  amiodarone HCl (PACERONE) tab 200 mg, 200 mg, Oral, Daily  aspirin EC tab 81 mg, 81 mg, Cardiology is following. Cont bumex gtt and diuril     #3. HTN- BP stable      #4. Anemia- in setting of CKD.  hgb stable. Will start POONAM    #5.   Hypokalemia- replace and follow      D/w pt and wife at bedside      Masha Trimble MD  6/27/2020  1:

## 2020-06-27 NOTE — PROGRESS NOTES
[facility]    Progress Note    Patzuleima Cheek Patient Status:  Inpatient    1940 MRN [id number]   [facility] 2NE-A Attending Jose Zepeda DO   Hosp Day # 9 PCP Madina Yu MD        Subjective:   Paul Copeland overload in the setting of CHF, I would not trust any bedside bladder scans to be accurate, because these can measure any abdominal or pelvic ascites present as urine. They do not discriminate between the two.       Maintain mohan until he is nearing disch

## 2020-06-27 NOTE — PROGRESS NOTES
· [facility]      Subjective:  Sleeping soundly, reviewed with RN.   Up most of night uncomfortable then found had urinary retention, now has Lea    Objective:  97/53  Afebrile SR 4 beat NSVT  I/O  -552  BUN/cr 109/3.98  Na 132  K 3.3    Card

## 2020-06-27 NOTE — PROGRESS NOTES
[facility] Hospitalist Progress Note                                                                   [address] [facility]  7/26/1940    SUBJECTIVE:  Pt seen and examined.   Denies feeling SOB, states docusate sodium  100 mg Oral BID   • epoetin nina-epbx  20,000 Units Subcutaneous Weekly   • lidocaine-prilocaine   Topical Once   • lidocaine  10 mL Urethral Once   • tamsulosin HCl  0.4 mg Oral Daily   • clotrimazole-betamethasone   Topical BID   • insul SVETLANA on CKD IV, Cardiorenal syndrome   - Hx CHD stage IV, cr baseline around 2.2-per renal, may need to tolerate higher creatinine to maintain  volume status.   Cr rising, now up to 4.12.  - likely cardiorenal from volume overload  - monitor I and O and mariann

## 2020-06-27 NOTE — PLAN OF CARE
Alert & oriented x4. NSR on tele. No noted arrythmias on dobs gtt. Potassium 3.2 & replaced per renal. Denies chest pain. Mild JARVIS. O2 sats WNL on room air. Requires 2L at noc. +2 BLE edema. +4 LUE edema; wrapped w/ ace wrap & elevated.  Continent of bowel of cardiac arrhythmias or at baseline  Description  INTERVENTIONS:  - Continuous cardiac monitoring, monitor vital signs, obtain 12 lead EKG if indicated  - Evaluate effectiveness of antiarrhythmic and heart rate control medications as ordered  - Initiate

## 2020-06-28 LAB
ANION GAP SERPL CALC-SCNC: 8 MMOL/L (ref 0–18)
BUN BLD-MCNC: 112 MG/DL (ref 7–18)
BUN/CREAT SERPL: 28.5 (ref 10–20)
CALCIUM BLD-MCNC: 8.5 MG/DL (ref 8.5–10.1)
CHLORIDE SERPL-SCNC: 91 MMOL/L (ref 98–112)
CO2 SERPL-SCNC: 34 MMOL/L (ref 21–32)
CREAT BLD-MCNC: 3.93 MG/DL (ref 0.7–1.3)
GLUCOSE BLD-MCNC: 139 MG/DL (ref 70–99)
GLUCOSE BLD-MCNC: 157 MG/DL (ref 70–99)
GLUCOSE BLD-MCNC: 167 MG/DL (ref 70–99)
GLUCOSE BLD-MCNC: 90 MG/DL (ref 70–99)
GLUCOSE BLD-MCNC: 95 MG/DL (ref 70–99)
OSMOLALITY SERPL CALC.SUM OF ELEC: 311 MOSM/KG (ref 275–295)
POTASSIUM SERPL-SCNC: 3 MMOL/L (ref 3.5–5.1)
SODIUM SERPL-SCNC: 133 MMOL/L (ref 136–145)

## 2020-06-28 PROCEDURE — 99233 SBSQ HOSP IP/OBS HIGH 50: CPT | Performed by: NURSE PRACTITIONER

## 2020-06-28 PROCEDURE — 99233 SBSQ HOSP IP/OBS HIGH 50: CPT | Performed by: INTERNAL MEDICINE

## 2020-06-28 RX ORDER — BISACODYL 10 MG
10 SUPPOSITORY, RECTAL RECTAL
Status: DISCONTINUED | OUTPATIENT
Start: 2020-06-28 | End: 2020-07-06

## 2020-06-28 RX ORDER — POLYETHYLENE GLYCOL 3350 17 G/17G
17 POWDER, FOR SOLUTION ORAL DAILY PRN
Status: DISCONTINUED | OUTPATIENT
Start: 2020-06-28 | End: 2020-07-06

## 2020-06-28 RX ORDER — POTASSIUM CHLORIDE 20 MEQ/1
40 TABLET, EXTENDED RELEASE ORAL 2 TIMES DAILY
Status: COMPLETED | OUTPATIENT
Start: 2020-06-28 | End: 2020-06-28

## 2020-06-28 NOTE — PROGRESS NOTES
· [facility]      Subjective:  Up in chair, dyspnea and edema improving daily. Objective:  97/53  Afebrile SR rare PVC  I/O  - 512 negative 7 liters  BUN/cr 112/3/93  Na 133  K 3.     Cardiac: S1 S2 regular  Lungs: clear anterior  Abdomen: s

## 2020-06-28 NOTE — PLAN OF CARE
Constipation deny abdominal pain good appetite active bowel sounds  Scheduled colace; prn miralax given pt refused dulcolax sup  Increase activity,  max activity patient can do is to stand up w/ 2 assisst and pivot to chair and bed  Lea catheter good uri medication profile  - Implement strategies to promote bladder emptying  Outcome: Progressing

## 2020-06-28 NOTE — PROGRESS NOTES
[facility] Hospitalist Progress Note                                                                   [address] [facility]  7/26/1940    SUBJECTIVE:  Pt seen and examined.   Denies feeling SOB, states 06/27/20  0704 06/27/20  1202 06/27/20  1623 06/27/20  2202 06/28/20  0656   PGLU 123* 122* 154* 172* 95       Meds:   Scheduled:   • Potassium Chloride ER  40 mEq Oral BID   • artificial tears   Both Eyes TID   • docusate sodium  100 mg Oral BID   • epoet bumex gtt and dobutamine gtt per cards and renal; diuril per renal; f/u u/o; SW for home set-up  -no BB while on   -s/p IR tunneled catheter 6/10  -s/p ICD interrogation   -HD being considered in the future      # SVETLANA on CKD IV, Cardiorenal syndrome   - recs  -flomax  -continues to retain, mohan replaced 6/27  - appreciate  eval, plan voiding trial around time of discharge     # hypokalemia  -replete per renal    # GOC  -appreciate palliative care eval, pt and family wish to continue full code with aggr

## 2020-06-28 NOTE — PROGRESS NOTES
[facility]  Nephrology Progress Note    Joe Mick Patient Status:  Observation    1940 MRN [id number]   [facility] 2NE-A Attending Phyllis Manzano MD   Hosp Day # 10 PCP Mami Avilez MD       SUBJECTIVE:  Fee without wheezes, rales, rhonchi.     Abdomen: Soft, non-tender. + Mildly distended, + bowel sounds  Extremities: Lower legs are wrapped bilaterally, 2+ dependent thigh edema  Neurologic: moving all extremities  Skin: Warm and dry, no rash        Labs:     R % urethral jelly 10 mL, 10 mL, Urethral, Once  tamsulosin HCl (FLOMAX) cap 0.4 mg, 0.4 mg, Oral, Daily  clotrimazole-betamethasone (LOTRISONE) cream, , Topical, BID  insulin glargine (LANTUS Solostar) 100 UNIT/ML injection 14 units, 14 Units, Subcutaneous, albeit a bit higher than prior baseline. We will continue to discuss the possibility that he will need HD at some point.      #2. Cardiorenal syndrome- at presentation noted to have wt gain/SOB/abd distention/edema in setting of severe CM.     Stable with

## 2020-06-28 NOTE — PLAN OF CARE
Assumed care of pt around 1930. Pt AOx4, forgetful at times. RA. Vpaced on tele.  and bumex gtt infusing per orders. BP borderline low, Dr. Devin Mcdaniel aware. Pt asymptomatic. Fall precautions maintained. Will continue to monitor.      Problem: Patient/Fam indicated  - Evaluate effectiveness of antiarrhythmic and heart rate control medications as ordered  - Initiate emergency measures for life threatening arrhythmias  - Monitor electrolytes and administer replacement therapy as ordered  Outcome: Progressing

## 2020-06-29 LAB
ALBUMIN SERPL-MCNC: 3.1 G/DL (ref 3.4–5)
ALBUMIN/GLOB SERPL: 0.8 {RATIO} (ref 1–2)
ALP LIVER SERPL-CCNC: 87 U/L (ref 45–117)
ALT SERPL-CCNC: 8 U/L (ref 16–61)
ANION GAP SERPL CALC-SCNC: 8 MMOL/L (ref 0–18)
ANION GAP SERPL CALC-SCNC: 8 MMOL/L (ref 0–18)
AST SERPL-CCNC: 23 U/L (ref 15–37)
BASOPHILS # BLD AUTO: 0.01 X10(3) UL (ref 0–0.2)
BASOPHILS NFR BLD AUTO: 0.2 %
BILIRUB SERPL-MCNC: 1.2 MG/DL (ref 0.1–2)
BUN BLD-MCNC: 108 MG/DL (ref 7–18)
BUN BLD-MCNC: 108 MG/DL (ref 7–18)
BUN/CREAT SERPL: 28.6 (ref 10–20)
BUN/CREAT SERPL: 28.6 (ref 10–20)
CALCIUM BLD-MCNC: 8.8 MG/DL (ref 8.5–10.1)
CALCIUM BLD-MCNC: 8.8 MG/DL (ref 8.5–10.1)
CHLORIDE SERPL-SCNC: 91 MMOL/L (ref 98–112)
CHLORIDE SERPL-SCNC: 91 MMOL/L (ref 98–112)
CO2 SERPL-SCNC: 35 MMOL/L (ref 21–32)
CO2 SERPL-SCNC: 35 MMOL/L (ref 21–32)
CREAT BLD-MCNC: 3.78 MG/DL (ref 0.7–1.3)
CREAT BLD-MCNC: 3.78 MG/DL (ref 0.7–1.3)
DEPRECATED RDW RBC AUTO: 77.5 FL (ref 35.1–46.3)
EOSINOPHIL # BLD AUTO: 0.16 X10(3) UL (ref 0–0.7)
EOSINOPHIL NFR BLD AUTO: 3.8 %
ERYTHROCYTE [DISTWIDTH] IN BLOOD BY AUTOMATED COUNT: 22.8 % (ref 11–15)
GLOBULIN PLAS-MCNC: 3.8 G/DL (ref 2.8–4.4)
GLUCOSE BLD-MCNC: 116 MG/DL (ref 70–99)
GLUCOSE BLD-MCNC: 116 MG/DL (ref 70–99)
GLUCOSE BLD-MCNC: 137 MG/DL (ref 70–99)
GLUCOSE BLD-MCNC: 139 MG/DL (ref 70–99)
GLUCOSE BLD-MCNC: 153 MG/DL (ref 70–99)
GLUCOSE BLD-MCNC: 193 MG/DL (ref 70–99)
HCT VFR BLD AUTO: 26.2 % (ref 39–53)
HGB BLD-MCNC: 8.2 G/DL (ref 13–17.5)
IMM GRANULOCYTES # BLD AUTO: 0.03 X10(3) UL (ref 0–1)
IMM GRANULOCYTES NFR BLD: 0.7 %
LYMPHOCYTES # BLD AUTO: 0.46 X10(3) UL (ref 1–4)
LYMPHOCYTES NFR BLD AUTO: 11 %
M PROTEIN MFR SERPL ELPH: 6.9 G/DL (ref 6.4–8.2)
MCH RBC QN AUTO: 29.5 PG (ref 26–34)
MCHC RBC AUTO-ENTMCNC: 31.3 G/DL (ref 31–37)
MCV RBC AUTO: 94.2 FL (ref 80–100)
MONOCYTES # BLD AUTO: 0.49 X10(3) UL (ref 0.1–1)
MONOCYTES NFR BLD AUTO: 11.7 %
NEUTROPHILS # BLD AUTO: 3.03 X10 (3) UL (ref 1.5–7.7)
NEUTROPHILS # BLD AUTO: 3.03 X10(3) UL (ref 1.5–7.7)
NEUTROPHILS NFR BLD AUTO: 72.6 %
OSMOLALITY SERPL CALC.SUM OF ELEC: 313 MOSM/KG (ref 275–295)
OSMOLALITY SERPL CALC.SUM OF ELEC: 313 MOSM/KG (ref 275–295)
PLATELET # BLD AUTO: 79 10(3)UL (ref 150–450)
POTASSIUM SERPL-SCNC: 3.6 MMOL/L (ref 3.5–5.1)
POTASSIUM SERPL-SCNC: 3.6 MMOL/L (ref 3.5–5.1)
RBC # BLD AUTO: 2.78 X10(6)UL (ref 3.8–5.8)
SODIUM SERPL-SCNC: 134 MMOL/L (ref 136–145)
SODIUM SERPL-SCNC: 134 MMOL/L (ref 136–145)
WBC # BLD AUTO: 4.2 X10(3) UL (ref 4–11)

## 2020-06-29 PROCEDURE — 99233 SBSQ HOSP IP/OBS HIGH 50: CPT | Performed by: INTERNAL MEDICINE

## 2020-06-29 RX ORDER — METOCLOPRAMIDE 5 MG/1
5 TABLET ORAL EVERY 6 HOURS PRN
Status: DISCONTINUED | OUTPATIENT
Start: 2020-06-29 | End: 2020-07-06

## 2020-06-29 RX ORDER — METOCLOPRAMIDE HYDROCHLORIDE 5 MG/ML
5 INJECTION INTRAMUSCULAR; INTRAVENOUS EVERY 6 HOURS PRN
Status: DISCONTINUED | OUTPATIENT
Start: 2020-06-29 | End: 2020-07-06

## 2020-06-29 RX ORDER — POTASSIUM CHLORIDE 20 MEQ/1
40 TABLET, EXTENDED RELEASE ORAL ONCE
Status: COMPLETED | OUTPATIENT
Start: 2020-06-29 | End: 2020-06-29

## 2020-06-29 RX ORDER — ONDANSETRON 2 MG/ML
4 INJECTION INTRAMUSCULAR; INTRAVENOUS EVERY 6 HOURS PRN
Status: DISCONTINUED | OUTPATIENT
Start: 2020-06-29 | End: 2020-07-06

## 2020-06-29 RX ORDER — SPIRONOLACTONE 25 MG/1
25 TABLET ORAL
Status: DISCONTINUED | OUTPATIENT
Start: 2020-06-29 | End: 2020-07-06

## 2020-06-29 NOTE — PROGRESS NOTES
[facility]  Cardiology Progress Note    Subjective:  Still with significant edema but slowly improving. I/O with net volume losses ~8.7 Liters. Weight down ~7-8lbs.     Objective:  BP 95/39 (BP Location: Right arm)   Pulse 68   Temp 97.7 °F (36.5 °C) QOD   • gabapentin  100 mg Oral Nightly     Echo 1/15/20:  Conclusions:     1. Left ventricle: The cavity size was mildly to moderately increased. Wall     thickness was normal. Systolic function was markedly reduced.  The     estimated ejection fraction wa out of 3 bypass grafts patent with plans for ongoing medical therapy.   · Lower extremity cellulitis  · Hyponatremia, worsening  · Right heel wound - need to reconsult wound care  · LUE edema, worsening  · Anemia - Hgb 8.2 today.  Bedside RN reports intermi stays around 4 plus / minus. He may face dialysis but we still going with IV Bumex. Dobutamine 5 mcg/kg/min per minute works for the patient well in terms of functional status and resolution of his significant encephalopathy.   Ideally, he should be discha

## 2020-06-29 NOTE — PROGRESS NOTES
[facility] Note:  Renal Dose Adjustment for Metoclopramide (REGLAN)    [facility] has been prescribed Metoclopramide (REGLAN) 10 mg every 6 hours as needed. Estimated Creatinine Clearance: 16.1 mL/min (A) (based on SCr of 3.78 mg/dL (H)).     Hi

## 2020-06-29 NOTE — PROGRESS NOTES
[facility] Hospitalist Progress Note                                                                   [address] [facility]  7/26/1940    SUBJECTIVE:  Pt seen and examined. Sitting in chair.  Butt marga sodium  100 mg Oral BID   • epoetin nina-epbx  20,000 Units Subcutaneous Weekly   • lidocaine-prilocaine   Topical Once   • lidocaine  10 mL Urethral Once   • tamsulosin HCl  0.4 mg Oral Daily   • clotrimazole-betamethasone   Topical BID   • insulin glargi -HD being considered in the future      # SVETLANA on CKD IV, Cardiorenal syndrome   - Hx CHD stage IV, cr baseline around 2.2-per renal, may need to tolerate higher creatinine to maintain  volume status.   Cr rising, now up to 4.12.  - likely cardiorenal from nausea  -not marked, prn zofran     # GOC  -appreciate palliative care eval, pt and family wish to continue full code with aggressive care. Prophy:  DVT: heparin subQ, some skin bruising, follow      Dispo: inpt care.  Plan ISAIAH vs home with home care th

## 2020-06-29 NOTE — CM/SW NOTE
Message left with jessy in admiissions @ [place] [phone]--current therapy recommendations still reflect ISAIAH stay @ 12-14 day stay--checking to see if ISAIAH stay could be back up if discharge to home not meeting the needs of the patient; additiona

## 2020-06-29 NOTE — PHYSICAL THERAPY NOTE
PHYSICAL THERAPY TREATMENT NOTE - INPATIENT    Room Number: 8181/7574-G     Session: 2     Number of Visits to Meet Established Goals: 5    Presenting Problem: CHF    History related to current admission: Pt is [de-identified]year old male who presented to the [facility] COLONOSCOPY     • EP DUAL CHAMBER ICD  01/16/2019    [address] [facility] dual chamber ICD implant BSCI.  Dr. Alex Ellis   • [address] [facility] N/A 7/8/2015    Performed by Isiah Clifton MD at [place] [address] AM-PAC Score:  Raw Score: 17   Approx Degree of Impairment: 50.57%   Standardized Score (AM-PAC Scale): 42.13   CMS Modifier (G-Code): CK    FUNCTIONAL ABILITY STATUS  Gait Assessment   Gait Assistance: Contact guard assist  Distance (ft): march in p DISCHARGE RECOMMENDATIONS  PT Discharge Recommendations: Sub-acute rehabilitation(ELOS 12-14 days)     PLAN  PT Treatment Plan: Bed mobility; Endurance; Energy conservation;Patient education; Family education;Gait training;Neuromuscular re-educate;Strengt

## 2020-06-29 NOTE — PLAN OF CARE
Pt is alert x 4, swollen all over face, on 1.5 liters of O2, NSR/ on the monitor. PT denies any cardiovascular symptoms at this time. Pt is up sit to stand with 2 assist, mohan in place. All needs met and will continue to monitor.        PLAN: continue DO

## 2020-06-29 NOTE — PROGRESS NOTES
[facility]  Nephrology Progress Note    Pranav Tim Attending:   Jose Floyd MD       Assessment and Plan:    1) SVETLANA / CKD 4- due to longstanding DM / HTN in setting of severe cardiomyopathy / cardiorenal syndrome; baseline Cr approx 2.5 79.0 06/29/2020    CREATSERUM 3.78 06/29/2020    CREATSERUM 3.78 06/29/2020     06/29/2020     06/29/2020     06/29/2020     06/29/2020    K 3.6 06/29/2020    K 3.6 06/29/2020    CL 91 06/29/2020    CL 91 06/29/2020    CO2 35.0 06 Oral, Q8H [address]  isosorbide dinitrate (ISORDIL TITRADOSE) tab 5 mg, 5 mg, Oral, Q8H  Albuterol Sulfate  (90 Base) MCG/ACT inhaler 1 puff, 1 puff, Inhalation, Q6H PRN  allopurinol (ZYLOPRIM) tab 100 mg, 100 mg, Oral, Daily  cholecalciferol (VITAMIN D3)

## 2020-06-29 NOTE — PLAN OF CARE
Assumed care of pt @ 1900. AOx4, forgetful at times. Bed alarm in place for safety. O2 sats maintained on 1 L NC, pt JARVIS. Bumex and dobut gtt infusing as ordered. Good urine output noted via mohan. SR on tele.  Small BM tonight after receiving miralax this

## 2020-06-30 LAB
ANION GAP SERPL CALC-SCNC: 7 MMOL/L (ref 0–18)
BUN BLD-MCNC: 104 MG/DL (ref 7–18)
BUN/CREAT SERPL: 26.9 (ref 10–20)
CALCIUM BLD-MCNC: 9 MG/DL (ref 8.5–10.1)
CHLORIDE SERPL-SCNC: 92 MMOL/L (ref 98–112)
CO2 SERPL-SCNC: 33 MMOL/L (ref 21–32)
CREAT BLD-MCNC: 3.86 MG/DL (ref 0.7–1.3)
GLUCOSE BLD-MCNC: 125 MG/DL (ref 70–99)
GLUCOSE BLD-MCNC: 132 MG/DL (ref 70–99)
GLUCOSE BLD-MCNC: 151 MG/DL (ref 70–99)
GLUCOSE BLD-MCNC: 86 MG/DL (ref 70–99)
GLUCOSE BLD-MCNC: 99 MG/DL (ref 70–99)
OSMOLALITY SERPL CALC.SUM OF ELEC: 306 MOSM/KG (ref 275–295)
PLATELET # BLD AUTO: 73 10(3)UL (ref 150–450)
POTASSIUM SERPL-SCNC: 3.6 MMOL/L (ref 3.5–5.1)
SODIUM SERPL-SCNC: 132 MMOL/L (ref 136–145)

## 2020-06-30 PROCEDURE — 99233 SBSQ HOSP IP/OBS HIGH 50: CPT | Performed by: INTERNAL MEDICINE

## 2020-06-30 RX ORDER — POTASSIUM CHLORIDE 20 MEQ/1
40 TABLET, EXTENDED RELEASE ORAL ONCE
Status: COMPLETED | OUTPATIENT
Start: 2020-06-30 | End: 2020-06-30

## 2020-06-30 NOTE — PROGRESS NOTES
[facility]  Nephrology Progress Note    Liset Mejia Attending:   Margorie Essex, MD       Assessment and Plan:    1) SVETLANA / CKD 4- due to longstanding DM / HTN in setting of severe cardiomyopathy / cardiorenal syndrome; baseline Cr approx 2.5  06/30/2020     06/30/2020    K 3.6 06/30/2020    CL 92 06/30/2020    CO2 33.0 06/30/2020    GLU 86 06/30/2020    CA 9.0 06/30/2020    PGLU 99 06/30/2020       Imaging: All imaging studies reviewed.     Meds:   spironolactone (ALDACTONE) ta TITRADOSE) tab 5 mg, 5 mg, Oral, Q8H  Albuterol Sulfate  (90 Base) MCG/ACT inhaler 1 puff, 1 puff, Inhalation, Q6H PRN  allopurinol (ZYLOPRIM) tab 100 mg, 100 mg, Oral, Daily  cholecalciferol (VITAMIN D3) cap/tab 1,000 Units, 1,000 Units, Oral, QOD

## 2020-06-30 NOTE — PLAN OF CARE
Pt is A&Ox4, forgetful. 2L NC saturating 95%. NSR/vpaced on tele, bumex & dobutamine gtt infusing. Diuril 500mg x1 given tonight. BP borderline. Lea draining clear yellow urine. Large hard Bm tonight on commode. Up x1 walker.  BLE dressings CDI will velazquez arrhythmias or at baseline  Description  INTERVENTIONS:  - Continuous cardiac monitoring, monitor vital signs, obtain 12 lead EKG if indicated  - Evaluate effectiveness of antiarrhythmic and heart rate control medications as ordered  - Initiate emergency m

## 2020-06-30 NOTE — PROGRESS NOTES
[facility]  Cardiology Progress Note    Subjective:  No chest pain or shortness of breath. Feels \"tired\". Did not sleep well last night. Wife at bedside and had a nice conversation with her about discharge planning.   She is concerned about him go Units Oral QOD   • gabapentin  100 mg Oral Nightly     Echo 1/15/20:  Conclusions:     1. Left ventricle: The cavity size was mildly to moderately increased. Wall     thickness was normal. Systolic function was markedly reduced.  The     estimated ejection [facility] January 2 out of 3 bypass grafts patent with plans for ongoing medical therapy.   · Lower extremity cellulitis  · Hyponatremia, slightly worse, likely diuretic effect  · Right heel wound - need to reconsult wound care  · LUE edema, worsening  · Anemia -

## 2020-06-30 NOTE — PROGRESS NOTES
[facility] Hospitalist Progress Note                                                                   [address] [facility]  7/26/1940    SUBJECTIVE:  Pt seen and examined. Sitting in chair.  Magdying h artificial tears   Both Eyes TID   • docusate sodium  100 mg Oral BID   • epoetin nina-epbx  20,000 Units Subcutaneous Weekly   • lidocaine-prilocaine   Topical Once   • lidocaine  10 mL Urethral Once   • tamsulosin HCl  0.4 mg Oral Daily   • clotrimazole- on   -s/p IR tunneled catheter 6/10  -s/p ICD interrogation   -HD being considered in the future      # SVETLANA on CKD IV, Cardiorenal syndrome   - Hx CHD stage IV, cr baseline around 2.2-per renal, may need to tolerate higher creatinine to maintain  volume around time of discharge     # hypokalemia  -replete per renal    # nausea  -not marked, prn zofran     # GOC  -appreciate palliative care eval, pt and family wish to continue full code with aggressive care.      Prophy:  DVT: heparin subQ, some skin bruisi

## 2020-06-30 NOTE — CM/SW NOTE
Met with spouse with patient sitting up in chair to discuss discharge planning.   . Barney Martínez who shared that she and the patient ave been  61 years and were blessed with 11 children--3 sons local, a son in [de-identified] and a daughter who passed from cancer

## 2020-07-01 LAB
ANION GAP SERPL CALC-SCNC: 6 MMOL/L (ref 0–18)
BUN BLD-MCNC: 112 MG/DL (ref 7–18)
BUN/CREAT SERPL: 28.3 (ref 10–20)
CALCIUM BLD-MCNC: 9.1 MG/DL (ref 8.5–10.1)
CHLORIDE SERPL-SCNC: 91 MMOL/L (ref 98–112)
CO2 SERPL-SCNC: 35 MMOL/L (ref 21–32)
CREAT BLD-MCNC: 3.96 MG/DL (ref 0.7–1.3)
DEPRECATED RDW RBC AUTO: 78.8 FL (ref 35.1–46.3)
ERYTHROCYTE [DISTWIDTH] IN BLOOD BY AUTOMATED COUNT: 22.8 % (ref 11–15)
GLUCOSE BLD-MCNC: 103 MG/DL (ref 70–99)
GLUCOSE BLD-MCNC: 118 MG/DL (ref 70–99)
GLUCOSE BLD-MCNC: 141 MG/DL (ref 70–99)
GLUCOSE BLD-MCNC: 149 MG/DL (ref 70–99)
GLUCOSE BLD-MCNC: 176 MG/DL (ref 70–99)
HCT VFR BLD AUTO: 25.2 % (ref 39–53)
HGB BLD-MCNC: 8.1 G/DL (ref 13–17.5)
MCH RBC QN AUTO: 30.5 PG (ref 26–34)
MCHC RBC AUTO-ENTMCNC: 32.1 G/DL (ref 31–37)
MCV RBC AUTO: 94.7 FL (ref 80–100)
OSMOLALITY SERPL CALC.SUM OF ELEC: 310 MOSM/KG (ref 275–295)
PLATELET # BLD AUTO: 75 10(3)UL (ref 150–450)
POTASSIUM SERPL-SCNC: 3.8 MMOL/L (ref 3.5–5.1)
RBC # BLD AUTO: 2.66 X10(6)UL (ref 3.8–5.8)
SODIUM SERPL-SCNC: 132 MMOL/L (ref 136–145)
WBC # BLD AUTO: 3.8 X10(3) UL (ref 4–11)

## 2020-07-01 PROCEDURE — 99233 SBSQ HOSP IP/OBS HIGH 50: CPT | Performed by: INTERNAL MEDICINE

## 2020-07-01 RX ORDER — POTASSIUM CHLORIDE 20 MEQ/1
40 TABLET, EXTENDED RELEASE ORAL ONCE
Status: COMPLETED | OUTPATIENT
Start: 2020-07-01 | End: 2020-07-01

## 2020-07-01 NOTE — PROGRESS NOTES
[facility] Hospitalist Progress Note                                                                   [address] [facility]  7/26/1940    SUBJECTIVE:  Pt seen and examined. Sitting in chair.  +u/o, ha Push  500 mg Intravenous Q12H   • artificial tears   Both Eyes TID   • docusate sodium  100 mg Oral BID   • epoetin nina-epbx  20,000 Units Subcutaneous Weekly   • lidocaine-prilocaine   Topical Once   • lidocaine  10 mL Urethral Once   • tamsulosin HCl  0 SW for home set-up  -no BB while on   -s/p IR tunneled catheter 6/10  -s/p ICD interrogation   -HD being considered in the future      # SVETLANA on CKD IV, Cardiorenal syndrome   - Hx CHD stage IV, cr baseline around 2.2-per renal, may need to tolerate high appreciate  eval, plan voiding trial around time of discharge     # hypokalemia  -replete per renal    # nausea  -not marked, prn zofran     # GOC  -appreciate palliative care eval, pt and wife to discuss further   Prophy:  DVT: heparin subQ, some skin b

## 2020-07-01 NOTE — DIETARY NOTE
[facility]     Admitting diagnosis:  Acute kidney injury ([facility] [address] [N17.9]  Hypervolemia, unspecified hypervolemia type [E87.70]    Ht: 177.8 cm (5' 10\")  Wt: 107 kg (235 lb 14.3 oz).  This is 148 % of IBW  Body m 1/17. Defer education at this time. Please consult if further education is needed. Otherwise, pt eating well. No GI distress. Wt changes noted consistent with fluid o/l and diuresis. Patient is at low nutrition risk at this time.     Please consult if p

## 2020-07-01 NOTE — PROGRESS NOTES
[facility]  Nephrology Progress Note    [facility] Officer Attending:   Minnie Oreilly MD       Assessment and Plan:    1) CKD 4- due to longstanding DM / HTN in setting of severe cardiomyopathy / cardiorenal syndrome; baseline Cr < 3 mg/dl- no fu 25.2 07/01/2020    PLT 75.0 07/01/2020    CREATSERUM 3.96 07/01/2020     07/01/2020     07/01/2020    K 3.8 07/01/2020    CL 91 07/01/2020    CO2 35.0 07/01/2020     07/01/2020    CA 9.1 07/01/2020    PGLU 149 07/01/2020       Imaging: Daily  aspirin EC tab 81 mg, 81 mg, Oral, Daily  isosorbide dinitrate (ISORDIL TITRADOSE) tab 5 mg, 5 mg, Oral, Q8H  Albuterol Sulfate  (90 Base) MCG/ACT inhaler 1 puff, 1 puff, Inhalation, Q6H PRN  allopurinol (ZYLOPRIM) tab 100 mg, 100 mg, Oral, D

## 2020-07-01 NOTE — PROGRESS NOTES
[facility]  Cardiology Progress Note    Subjective:  Generally weak and very deconditioned. Wife at bedside. Feels about the same. Has only lost ~1lb over the last 72 hours and renal function continues to decline.   He spends much of his day asleep Oral Daily   • aspirin  81 mg Oral Daily   • isosorbide dinitrate  5 mg Oral Q8H   • allopurinol  100 mg Oral Daily   • cholecalciferol  1,000 Units Oral QOD   • gabapentin  100 mg Oral Nightly        Echo 1/15/20:  Conclusions:     1. Left ventricle:  The NSVT during last admission prompting discontinuation of inotropes.  Now appears to be tolerating dobutamine well.    · CAD w/ hx CABG, prior PCI - [facility] January 2 out of 3 bypass grafts patent with plans for ongoing medical therapy.   · Lower extremity cellul the hospice process, as she went through that with her daughter who passed from Lung Ca. Palliative care has already evaluated him.   I spoke with the Palliative care APN today, who encouraged a conversation with his physicians and  Mr. Marie Ayala prior to to deteriorate. It is very difficult decision about seems to be most appropriate here. We will continue Bumex and dobutamine today, with hospice evaluation within the next 24 hours.   I do not think he should be started on dialysis since he would not tole

## 2020-07-01 NOTE — PLAN OF CARE
Patient a&0x4 , denies any cp /chest discomfort, o2  1 L N/C on tolerating good,sinus on tele monitor.  Dobutamine and Bumex drip infusing well , Lea intact and training good ,bottom mepileix applied , leg dressing intact, anna /rash care with clotrimazo

## 2020-07-01 NOTE — PLAN OF CARE
NSR  or paced rhythm  deny chest pain deny dyspnea at rest  sweeney less per patient   Dobutamine drip  and bumex drips infusing as ordered  Accuchecks qid  Up in chair q meals  w/ 2 assisst or sit to stand  Max assisst w/ adls'  Palliative rn to see patient

## 2020-07-02 LAB
ANION GAP SERPL CALC-SCNC: 6 MMOL/L (ref 0–18)
BUN BLD-MCNC: 106 MG/DL (ref 7–18)
BUN/CREAT SERPL: 25.8 (ref 10–20)
CALCIUM BLD-MCNC: 8.8 MG/DL (ref 8.5–10.1)
CHLORIDE SERPL-SCNC: 91 MMOL/L (ref 98–112)
CO2 SERPL-SCNC: 34 MMOL/L (ref 21–32)
CREAT BLD-MCNC: 4.11 MG/DL (ref 0.7–1.3)
DEPRECATED RDW RBC AUTO: 76.4 FL (ref 35.1–46.3)
ERYTHROCYTE [DISTWIDTH] IN BLOOD BY AUTOMATED COUNT: 22.5 % (ref 11–15)
GLUCOSE BLD-MCNC: 110 MG/DL (ref 70–99)
GLUCOSE BLD-MCNC: 110 MG/DL (ref 70–99)
GLUCOSE BLD-MCNC: 118 MG/DL (ref 70–99)
GLUCOSE BLD-MCNC: 284 MG/DL (ref 70–99)
GLUCOSE BLD-MCNC: 90 MG/DL (ref 70–99)
HCT VFR BLD AUTO: 25.7 % (ref 39–53)
HGB BLD-MCNC: 8.1 G/DL (ref 13–17.5)
MCH RBC QN AUTO: 29.6 PG (ref 26–34)
MCHC RBC AUTO-ENTMCNC: 31.5 G/DL (ref 31–37)
MCV RBC AUTO: 93.8 FL (ref 80–100)
OSMOLALITY SERPL CALC.SUM OF ELEC: 306 MOSM/KG (ref 275–295)
PLATELET # BLD AUTO: 66 10(3)UL (ref 150–450)
POTASSIUM SERPL-SCNC: 4.1 MMOL/L (ref 3.5–5.1)
RBC # BLD AUTO: 2.74 X10(6)UL (ref 3.8–5.8)
SODIUM SERPL-SCNC: 131 MMOL/L (ref 136–145)
WBC # BLD AUTO: 4.3 X10(3) UL (ref 4–11)

## 2020-07-02 PROCEDURE — 99233 SBSQ HOSP IP/OBS HIGH 50: CPT | Performed by: INTERNAL MEDICINE

## 2020-07-02 PROCEDURE — 99232 SBSQ HOSP IP/OBS MODERATE 35: CPT | Performed by: INTERNAL MEDICINE

## 2020-07-02 RX ORDER — TRAZODONE HYDROCHLORIDE 50 MG/1
50 TABLET ORAL NIGHTLY
COMMUNITY
Start: 2020-05-20

## 2020-07-02 NOTE — PLAN OF CARE
Obtained pt at 1930. Pt a/o x4 but can be forgetful. Lungs are diminished on 1L O2, sat 93%. HR is NSR/Vpaced on telemetry. BM today. Denies any pain or SOB. Central line dressing is clean, dry, and intact. Bumex gtt infusing and dobutamine gtt infusing.  P baseline  Description  INTERVENTIONS:  - Continuous cardiac monitoring, monitor vital signs, obtain 12 lead EKG if indicated  - Evaluate effectiveness of antiarrhythmic and heart rate control medications as ordered  - Initiate emergency measures for life t

## 2020-07-02 NOTE — PROGRESS NOTES
[facility]  AM-S Cardiology  Progress Note  2:15 PM till 3:05 PM  50-minute patient care with more than half time face-to-face and the rest of time going through the chart discussing plan and management with the team.    Olegario Hennessy Patient S awake.  Musculoskeletal: normal range of motion, normal muscle strength, no joint effusion. Integumentary: Lower leg cellulitis wrapped with dressing.     Laboratory/Data:    Labs:       Recent Labs   Lab 06/26/20  1393 06/29/20  0534 06/30/20  1804 07/01 overall degree  SINUSES:  No acute sinusitis. MASTOIDS:  The mastoids are clear. SKULL:  No evidence for fracture or acute osseous abnormality. OTHER:  None. CONCLUSION:  Chronic brain changes as described. No acute bleed.   No CT features for acute t PRN  PEG 3350 (MIRALAX) powder packet 17 g, 17 g, Oral, Daily PRN  bisacodyl (DULCOLAX) rectal suppository 10 mg, 10 mg, Rectal, Daily PRN  artificial tears 83-15 % ophthalmic ointment, , Both Eyes, TID  docusate sodium (COLACE) cap 100 mg, 100 mg, Oral, B Or  glucose (DEX4) oral liquid 30 g, 30 g, Oral, Q15 Min PRN    Or  Glucose-Vitamin C (DEX-4) chewable tab 8 tablet, 8 tablet, Oral, Q15 Min PRN        Allergies:    Levemir                 SWELLING  Lovaza                  BLEEDING  Morphine wishes and request -we will respect their wishes and will continue dobutamine drip at 5 mcg/kg/min over the weekend -patient will be in hospice on dobutamine for now -he has less than 6 months of life left and most likely even shorter time to spend with fa

## 2020-07-02 NOTE — CM/SW NOTE
Call received from [address] [place] [address] with [facility]--patient verbalized that he would like to enter hospice care with his dobutamine infusion (checking with medical director).   Wife shared that she would need to hire a caregiver as patient has fallen on several oc

## 2020-07-02 NOTE — PROGRESS NOTES
[facility]  Nephrology Progress Note    Edilberto Em Attending:   Sam Worthington MD       Assessment and Plan:    1) CKD 4- due to longstanding DM / HTN in setting of severe cardiomyopathy / cardiorenal syndrome; baseline Cr < 3 mg/dl- no fu Date    WBC 4.3 07/02/2020    HGB 8.1 07/02/2020    HCT 25.7 07/02/2020    PLT 66.0 07/02/2020    CREATSERUM 4.11 07/02/2020     07/02/2020     07/02/2020    K 4.1 07/02/2020    CL 91 07/02/2020    CO2 34.0 07/02/2020     07/02/2020 PRN  amiodarone HCl (PACERONE) tab 200 mg, 200 mg, Oral, Daily  aspirin EC tab 81 mg, 81 mg, Oral, Daily  isosorbide dinitrate (ISORDIL TITRADOSE) tab 5 mg, 5 mg, Oral, Q8H  Albuterol Sulfate  (90 Base) MCG/ACT inhaler 1 puff, 1 puff, Inhalation, Q6

## 2020-07-02 NOTE — PROGRESS NOTES
[facility] Hospitalist Progress Note                                                                   [address] [facility]  7/26/1940    SUBJECTIVE:  Pt laying in bed, napping.  Feeling about the same mg Intravenous Q12H   • artificial tears   Both Eyes TID   • docusate sodium  100 mg Oral BID   • epoetin nina-epbx  20,000 Units Subcutaneous Weekly   • lidocaine-prilocaine   Topical Once   • lidocaine  10 mL Urethral Once   • tamsulosin HCl  0.4 mg Oral home set-up  -no BB while on   -s/p IR tunneled catheter 6/10  -s/p ICD interrogation   -unlikely to tolerate HD     # SVETLANA on CKD IV, Cardiorenal syndrome   - Hx CHD stage IV, cr baseline around 2.2-per renal, may need to tolerate higher creatinine to m mohan replaced 6/27  - appreciate  eval, plan voiding trial around time of discharge     # hypokalemia  -replete per renal    # nausea  -not marked, prn zofran     # GOC  -appreciate palliative care eval, per  notes, plan to transition to hospice at [place]

## 2020-07-02 NOTE — PLAN OF CARE
Problem: Patient/Family Goals  Discharge planning in progress  Home with [facility]  Dobutamine gtt  Goal: Patient/Family Long Term Goal  Description  Patient's Long Term Goal: go home    Interventions:  - ambulation  - cardiac monitoring  - See laura EKG if indicated  - Evaluate effectiveness of antiarrhythmic and heart rate control medications as ordered  - Initiate emergency measures for life threatening arrhythmias  - Monitor electrolytes and administer replacement therapy as ordered  Outcome: Progr

## 2020-07-02 NOTE — PHYSICAL THERAPY NOTE
PHYSICAL THERAPY TREATMENT NOTE - INPATIENT    Room Number: 5031/3331-N     Session: 3    Number of Visits to Meet Established Goals: 5    Presenting Problem: CHF    History related to current admission: Pt is [de-identified]year old male who presented to the ED fr COLONOSCOPY     • EP DUAL CHAMBER ICD  01/16/2019    [address] [facility] dual chamber ICD implant [employer].  Dr. Dionte Murphy   • [address] N/A 7/8/2015    Performed by Carmen Guevara MD at [place] [address]  Assistive Device: Rolling walker  Pattern: (ericka flat feet, dec step length)  Stoop/Curb Assistance: Not tested         Skilled Therapy Provided:     Pt needed min to mod assist to stand from the chair with increased effort due to edema of le's and dec ambulate 200 feet with assist device: walker - rolling at assistance level: supervision      Goal #4     Goal #5     Goal #6     Goal Comments: Goals established on 6/22/2020 7/2/2020 all goals ongoing. Therapist in surgical mask and gloves.  Patient

## 2020-07-03 LAB
GLUCOSE BLD-MCNC: 105 MG/DL (ref 70–99)
GLUCOSE BLD-MCNC: 130 MG/DL (ref 70–99)
GLUCOSE BLD-MCNC: 149 MG/DL (ref 70–99)
GLUCOSE BLD-MCNC: 155 MG/DL (ref 70–99)
PLATELET # BLD AUTO: 69 10(3)UL (ref 150–450)

## 2020-07-03 PROCEDURE — 99233 SBSQ HOSP IP/OBS HIGH 50: CPT | Performed by: NURSE PRACTITIONER

## 2020-07-03 PROCEDURE — 99232 SBSQ HOSP IP/OBS MODERATE 35: CPT | Performed by: INTERNAL MEDICINE

## 2020-07-03 NOTE — PLAN OF CARE
Assumed pt care at [facility]. A&Ox4, forgetful. 2L, denies chest pain/SOB, lung sounds diminished, VSS, VPaced on tele. Venu, EDEN. Continent of bowel. Up with 1/walker. R IJ in place. POC bumex/dobs gtt, diuril, wound care, sw for d/c planning (hospice).  POC u baseline  Description  INTERVENTIONS:  - Continuous cardiac monitoring, monitor vital signs, obtain 12 lead EKG if indicated  - Evaluate effectiveness of antiarrhythmic and heart rate control medications as ordered  - Initiate emergency measures for life t

## 2020-07-03 NOTE — PLAN OF CARE
Assumed care of pt at 0730. Pt states he still feels a little SOB but better with O2. He is drowsy but arousable and conversational. C/o bilateral heel pain and sore buttocks. Pain relieved by PRN Tylenol. Foam boots on and pt repositioned regularly.    Sin

## 2020-07-03 NOTE — PROGRESS NOTES
· [facility]      Subjective:  Sleepy but arousable, no dypsnea.  Reviewed with wife, sleeping most of day, planning for Hospice on Monday    Objective:  101/42  SR  Few PVCc  Afebrile  I/O - 4650 no labs    Neuro: sleepy but arousable  HEENT:

## 2020-07-03 NOTE — PROGRESS NOTES
[facility] Hospitalist Progress Note                                                                   [address] [facility]  7/26/1940    SUBJECTIVE:  Wife at beside, patient a  Bit restless    OBJECT mg Oral Daily   • isosorbide dinitrate  5 mg Oral Q8H   • allopurinol  100 mg Oral Daily   • cholecalciferol  1,000 Units Oral QOD   • gabapentin  100 mg Oral Nightly     Continuous Infusions:   • bumetanide (BUMEX) 0.125 mg/ml infusion 1.5 mg/hr (07/03/20 failure secondary to chf above SOB/Hypoxia- on 1L  - Suspected secondary to above  - Wean O2 as tolerated  - rapid COVID testing negative    - Diuresis as note above     # LE cellulitits, resolving; heel wound   - s/p course of IV cefazolin and po keflex l

## 2020-07-03 NOTE — OCCUPATIONAL THERAPY NOTE
Per chart review Pt is planning to go hospice starting on Monday. Pt not appropriate for skilled IP OT services at this time. OT will sign off at this time.

## 2020-07-03 NOTE — CM/SW NOTE
Spoke to [facility] [employer] dr Lola Mccloud note from yesterday with ongoing infusion of dobutamine with hospice care (fax )

## 2020-07-03 NOTE — CM/SW NOTE
Briefly met with mrs owens to provide emotional support--very tearful--encouraged her to keep talking with her spouse--acknowledged 61 years of marriage must be filled with great memories . . . Patient will be happy to go home.   Spouse expressed apprecia

## 2020-07-03 NOTE — PROGRESS NOTES
[facility]  Nephrology Progress Note    Joe Barton Attending:   Sunita Nielsen MD       Assessment and Plan:    1) CKD 4- due to longstanding DM / HTN in setting of severe cardiomyopathy / cardiorenal syndrome     2) Severe chronic systoli studies reviewed.     Meds:   spironolactone (ALDACTONE) tab 25 mg, 25 mg, Oral, BID (Diuretic)  chlorothiazide (DIURIL) 500 mg in Sterile Water for Injection IV push, 500 mg, Intravenous, Q12H  ondansetron HCl (ZOFRAN) injection 4 mg, 4 mg, Intravenous, Q6 (VITAMIN D3) cap/tab 1,000 Units, 1,000 Units, Oral, QOD  gabapentin (NEURONTIN) cap 100 mg, 100 mg, Oral, Nightly  Meclizine HCl (ANTIVERT) tab 25 mg, 25 mg, Oral, TID PRN  glucose (DEX4) oral liquid 15 g, 15 g, Oral, Q15 Min PRN    Or  Glucose-Vitamin C

## 2020-07-04 LAB
ANION GAP SERPL CALC-SCNC: 7 MMOL/L (ref 0–18)
BUN BLD-MCNC: 105 MG/DL (ref 7–18)
BUN/CREAT SERPL: 26.4 (ref 10–20)
CALCIUM BLD-MCNC: 9 MG/DL (ref 8.5–10.1)
CHLORIDE SERPL-SCNC: 88 MMOL/L (ref 98–112)
CO2 SERPL-SCNC: 35 MMOL/L (ref 21–32)
CREAT BLD-MCNC: 3.98 MG/DL (ref 0.7–1.3)
DEPRECATED RDW RBC AUTO: 74 FL (ref 35.1–46.3)
ERYTHROCYTE [DISTWIDTH] IN BLOOD BY AUTOMATED COUNT: 22 % (ref 11–15)
GLUCOSE BLD-MCNC: 115 MG/DL (ref 70–99)
GLUCOSE BLD-MCNC: 145 MG/DL (ref 70–99)
GLUCOSE BLD-MCNC: 153 MG/DL (ref 70–99)
GLUCOSE BLD-MCNC: 165 MG/DL (ref 70–99)
GLUCOSE BLD-MCNC: 193 MG/DL (ref 70–99)
HCT VFR BLD AUTO: 25.7 % (ref 39–53)
HGB BLD-MCNC: 8.2 G/DL (ref 13–17.5)
MCH RBC QN AUTO: 29.6 PG (ref 26–34)
MCHC RBC AUTO-ENTMCNC: 31.9 G/DL (ref 31–37)
MCV RBC AUTO: 92.8 FL (ref 80–100)
OSMOLALITY SERPL CALC.SUM OF ELEC: 307 MOSM/KG (ref 275–295)
PLATELET # BLD AUTO: 69 10(3)UL (ref 150–450)
PLATELET # BLD AUTO: 71 10(3)UL (ref 150–450)
POTASSIUM SERPL-SCNC: 3.8 MMOL/L (ref 3.5–5.1)
RBC # BLD AUTO: 2.77 X10(6)UL (ref 3.8–5.8)
SODIUM SERPL-SCNC: 130 MMOL/L (ref 136–145)
WBC # BLD AUTO: 4.4 X10(3) UL (ref 4–11)

## 2020-07-04 PROCEDURE — 99233 SBSQ HOSP IP/OBS HIGH 50: CPT | Performed by: NURSE PRACTITIONER

## 2020-07-04 NOTE — PROGRESS NOTES
[facility] Hospitalist Progress Note                                                                   [address] [facility]  7/26/1940    SUBJECTIVE:  Wife at beside, up in chair    OBJECTIVE:  Temp: mg Oral Daily   • aspirin  81 mg Oral Daily   • isosorbide dinitrate  5 mg Oral Q8H   • allopurinol  100 mg Oral Daily   • cholecalciferol  1,000 Units Oral QOD   • gabapentin  100 mg Oral Nightly     Continuous Infusions:   • bumetanide (BUMEX) 0.125 mg/m tolerated     #Acute hypoxic respiratory failure secondary to chf above SOB/Hypoxia- on 1L  - Suspected secondary to above  - Wean O2 as tolerated  - rapid COVID testing negative    - Diuresis as note above     # LE cellulitits, resolving; heel wound   - s

## 2020-07-04 NOTE — PLAN OF CARE
NSR/V paced at times on telemetry. VSS. No c/o cardiac symptoms. Bumex and Dobutamine gtts infusing as ordered. On room air. SPO2 remaining >90% while awake, dropping to 70% with sleep. O2 applied at 4L per nasal canula.  maintained.   No c/o shortne arrhythmias or at baseline  Description  INTERVENTIONS:  - Continuous cardiac monitoring, monitor vital signs, obtain 12 lead EKG if indicated  - Evaluate effectiveness of antiarrhythmic and heart rate control medications as ordered  - Initiate emergency m

## 2020-07-04 NOTE — PLAN OF CARE
A/o x4, NSR to v paced on tele. 94% o2 on 2L NC. Denies any SOB at this time. C/o pain to heel wounds, declined any intervention. R heel wound with dressing, CDI. Lea in place, draining well. Dobutamine gtt infusing @ 17 ml/hr.  Bumex gtt infusing @ 12 ml arrhythmias or at baseline  Description  INTERVENTIONS:  - Continuous cardiac monitoring, monitor vital signs, obtain 12 lead EKG if indicated  - Evaluate effectiveness of antiarrhythmic and heart rate control medications as ordered  - Initiate emergency m

## 2020-07-04 NOTE — PROGRESS NOTES
· [facility]      Subjective:  No dyspnea overnight, c/o right foot pain.       Objective:  SR rare PVC  Afebrile 98/34  I/O incomplete but with 4500 UO  BMP pending    Neuro: awake/alert  HEENT: JVD  Cardiac: S1 S2 regular 2/6 systolic murmur

## 2020-07-05 LAB
ANION GAP SERPL CALC-SCNC: 3 MMOL/L (ref 0–18)
BUN BLD-MCNC: 99 MG/DL (ref 7–18)
BUN/CREAT SERPL: 24.4 (ref 10–20)
CALCIUM BLD-MCNC: 9.4 MG/DL (ref 8.5–10.1)
CHLORIDE SERPL-SCNC: 88 MMOL/L (ref 98–112)
CO2 SERPL-SCNC: 37 MMOL/L (ref 21–32)
CREAT BLD-MCNC: 4.05 MG/DL (ref 0.7–1.3)
GLUCOSE BLD-MCNC: 120 MG/DL (ref 70–99)
GLUCOSE BLD-MCNC: 124 MG/DL (ref 70–99)
GLUCOSE BLD-MCNC: 143 MG/DL (ref 70–99)
GLUCOSE BLD-MCNC: 143 MG/DL (ref 70–99)
GLUCOSE BLD-MCNC: 166 MG/DL (ref 70–99)
OSMOLALITY SERPL CALC.SUM OF ELEC: 298 MOSM/KG (ref 275–295)
PLATELET # BLD AUTO: 75 10(3)UL (ref 150–450)
POTASSIUM SERPL-SCNC: 3.7 MMOL/L (ref 3.5–5.1)
SODIUM SERPL-SCNC: 128 MMOL/L (ref 136–145)

## 2020-07-05 PROCEDURE — 99233 SBSQ HOSP IP/OBS HIGH 50: CPT | Performed by: NURSE PRACTITIONER

## 2020-07-05 RX ORDER — DOBUTAMINE HYDROCHLORIDE 200 MG/100ML
5 INJECTION INTRAVENOUS CONTINUOUS
Qty: 250 ML | Refills: 3 | Status: SHIPPED | OUTPATIENT
Start: 2020-07-05

## 2020-07-05 RX ORDER — BUTALBITAL, ACETAMINOPHEN AND CAFFEINE 50; 325; 40 MG/1; MG/1; MG/1
1 TABLET ORAL ONCE
Status: COMPLETED | OUTPATIENT
Start: 2020-07-05 | End: 2020-07-05

## 2020-07-05 RX ORDER — DOBUTAMINE HYDROCHLORIDE 200 MG/100ML
5 INJECTION INTRAVENOUS CONTINUOUS
Qty: 250 ML | Refills: 3 | Status: SHIPPED | OUTPATIENT
Start: 2020-07-05 | End: 2020-07-05

## 2020-07-05 NOTE — CM/SW NOTE
Spoke with [facility] representative roman--requesting dobutamine script, copy of polst form and early d/c tomorrow @ 0900--spoke with sp morales who would print script, page to heidy as pt code status changed, no POLST completed, spoke with dr Jose Gastelum

## 2020-07-05 NOTE — PROGRESS NOTES
07/05/20 1640   Clinical Encounter Type   Visited With Family  (wife)   Routine Visit Follow-up  ( paged to do POLST)   Continue Visiting No  (wife will page as needed)   Congregational Encounters   Congregational Needs Prayer  ( prayed with wife

## 2020-07-05 NOTE — PROGRESS NOTES
· [facility]      Subjective:  C/o acid reflux this morning, felt like he was going to vomit, now resolved    Objective:  119/43  Afebrile  SR rare PVC rare a pacing  I/O -2648  BUN/cr 99/4.05  plt 75K    Neuro: awake/alert  HEENT: CARON Woo

## 2020-07-05 NOTE — CM/SW NOTE
Copy of dobutamine script, progress note addressing dni/dnr status (pending completion of POLST form  And d/c order faxed to [phone]

## 2020-07-05 NOTE — PROGRESS NOTES
[facility] Hospitalist Progress Note                                                                   [address] [facility]  7/26/1940    SUBJECTIVE:  Sleeping in bed, NAD    OBJECTIVE:  Temp:  [97.3 Oral Daily   • aspirin  81 mg Oral Daily   • isosorbide dinitrate  5 mg Oral Q8H   • allopurinol  100 mg Oral Daily   • cholecalciferol  1,000 Units Oral QOD   • gabapentin  100 mg Oral Nightly     Continuous Infusions:   • bumetanide (BUMEX) 0.125 mg/ml i tolerated     #Acute hypoxic respiratory failure secondary to chf above SOB/Hypoxia- on 1L  - Suspected secondary to above  - Wean O2 as tolerated  - rapid COVID testing negative    - Diuresis as note above     # LE cellulitits, resolving; heel wound   - s

## 2020-07-05 NOTE — PLAN OF CARE
Uncontrolled headache all night despite tylenol prn- see KANE Power called and one time dose Fioricet ordered    Drowsy  A/O x 4  2liters oxygen,   Right CVC with dobutamine and bumex gtts infusions  Wound care as ordered BLE  Lea draining   Diu

## 2020-07-05 NOTE — PLAN OF CARE
Pt alert able to  make needs known. Denies any pain. Cont on dobutamine and bumex gtts   Pt had 11 beats of VT cardiology APN Mookie Pretty made aware.    Plan is to dc home with hospice tomorrow    Plan of care discussed with pt and wife, verbalized Marleny Evaluate fluid balance, assess for edema, trend weights  Outcome: Progressing  Goal: Absence of cardiac arrhythmias or at baseline  Description  INTERVENTIONS:  - Continuous cardiac monitoring, monitor vital signs, obtain 12 lead EKG if indicated  - Evalua

## 2020-07-05 NOTE — PROGRESS NOTES
Cardiology addendum    Called to evaluate chest pain. He reports squeezing pain then hard to catch his breath - wife says he passed out of this episode, lasted a few minutes. Different from the acid reflux pain he had this morning.   I said we are going

## 2020-07-06 VITALS
RESPIRATION RATE: 20 BRPM | HEIGHT: 70 IN | BODY MASS INDEX: 29.16 KG/M2 | SYSTOLIC BLOOD PRESSURE: 101 MMHG | OXYGEN SATURATION: 94 % | TEMPERATURE: 98 F | DIASTOLIC BLOOD PRESSURE: 49 MMHG | WEIGHT: 203.69 LBS | HEART RATE: 86 BPM

## 2020-07-06 LAB
ANION GAP SERPL CALC-SCNC: 9 MMOL/L (ref 0–18)
BUN BLD-MCNC: 98 MG/DL (ref 7–18)
BUN/CREAT SERPL: 24.2 (ref 10–20)
CALCIUM BLD-MCNC: 9.3 MG/DL (ref 8.5–10.1)
CHLORIDE SERPL-SCNC: 83 MMOL/L (ref 98–112)
CO2 SERPL-SCNC: 37 MMOL/L (ref 21–32)
CREAT BLD-MCNC: 4.05 MG/DL (ref 0.7–1.3)
GLUCOSE BLD-MCNC: 112 MG/DL (ref 70–99)
GLUCOSE BLD-MCNC: 133 MG/DL (ref 70–99)
GLUCOSE BLD-MCNC: 145 MG/DL (ref 70–99)
OSMOLALITY SERPL CALC.SUM OF ELEC: 300 MOSM/KG (ref 275–295)
POTASSIUM SERPL-SCNC: 3.4 MMOL/L (ref 3.5–5.1)
SODIUM SERPL-SCNC: 129 MMOL/L (ref 136–145)

## 2020-07-06 PROCEDURE — 99233 SBSQ HOSP IP/OBS HIGH 50: CPT | Performed by: NURSE PRACTITIONER

## 2020-07-06 RX ORDER — TRAMADOL HYDROCHLORIDE 50 MG/1
25 TABLET ORAL EVERY 12 HOURS PRN
Status: DISCONTINUED | OUTPATIENT
Start: 2020-07-06 | End: 2020-07-06

## 2020-07-06 RX ORDER — TAMSULOSIN HYDROCHLORIDE 0.4 MG/1
0.4 CAPSULE ORAL DAILY
Qty: 30 CAPSULE | Refills: 0 | Status: SHIPPED | OUTPATIENT
Start: 2020-07-06 | End: 2020-08-05

## 2020-07-06 RX ORDER — BUMETANIDE 2 MG/1
2 TABLET ORAL 2 TIMES DAILY
Qty: 60 TABLET | Refills: 11 | Status: SHIPPED | OUTPATIENT
Start: 2020-07-06 | End: 2020-08-05

## 2020-07-06 RX ORDER — TRAMADOL HYDROCHLORIDE 50 MG/1
25 TABLET ORAL EVERY 6 HOURS
Status: DISCONTINUED | OUTPATIENT
Start: 2020-07-06 | End: 2020-07-06

## 2020-07-06 RX ORDER — SPIRONOLACTONE 25 MG/1
25 TABLET ORAL
Qty: 60 TABLET | Refills: 3 | Status: SHIPPED | OUTPATIENT
Start: 2020-07-06

## 2020-07-06 RX ORDER — TRAMADOL HYDROCHLORIDE 50 MG/1
25 TABLET ORAL EVERY 12 HOURS PRN
Qty: 20 TABLET | Refills: 0 | Status: SHIPPED | OUTPATIENT
Start: 2020-07-06

## 2020-07-06 RX ORDER — BUMETANIDE 0.25 MG/ML
2 INJECTION, SOLUTION INTRAMUSCULAR; INTRAVENOUS ONCE
Status: COMPLETED | OUTPATIENT
Start: 2020-07-06 | End: 2020-07-06

## 2020-07-06 RX ORDER — HYDROCODONE BITARTRATE AND ACETAMINOPHEN 5; 325 MG/1; MG/1
1 TABLET ORAL EVERY 6 HOURS PRN
Status: DISCONTINUED | OUTPATIENT
Start: 2020-07-06 | End: 2020-07-06

## 2020-07-06 NOTE — PLAN OF CARE
Assumed care at 0000. Pt is A&Ox4, up with 2x a walker. Denies chest pain and SOB. O2 sats WNL on 2L NC. Lung sounds clear but diminished bilaterally. Pt is NSR on tele, RRR. Occasional PVCs. 2+ swelling in BLE. Bowel sounds active.  Lea intact draining c of cardiac arrhythmias or at baseline  Description  INTERVENTIONS:  - Continuous cardiac monitoring, monitor vital signs, obtain 12 lead EKG if indicated  - Evaluate effectiveness of antiarrhythmic and heart rate control medications as ordered  - Initiate

## 2020-07-06 NOTE — PROGRESS NOTES
[facility] Pharmacy Note:  Renal Dose Adjustment for Tramadol (ULTRAM)    Jaylen Hyman has been prescribed Tramadol (ULTRAM) 25 mg orally every 6 hours as needed for pain. Estimated Creatinine Clearance: 15 mL/min (A) (based on SCr of 4.05 mg/dL (H)).

## 2020-07-06 NOTE — PROGRESS NOTES
[facility]  Cardiology Progress Note    Subjective:  Very weak. Generally ill-appearing.       Objective:  BP (!) 85/34 (BP Location: Left arm)   Pulse 88   Temp 97.4 °F (36.3 °C) (Oral)   Resp 18   Ht 70\"   Wt 203 lb 11.3 oz   SpO2 95%   BMI 29.23 k of decreased left ventricular     diastolic compliance and/or increased left atrial pressure. 2. Aortic valve: Trileaflet; mildly thickened leaflets. Transvalvular     velocity was within the normal range. There was no stenosis. Trivial     regurgitation. arrangements taken care of.    Niru May, [place]  7/6/2020  9:06 AM

## 2020-07-06 NOTE — PLAN OF CARE
Assumed care of patient around 0730. Patient laying in bed. Denies sob. 2L NC, . NSR on tele. Multiple BP readings taken to ensure accuracy. Slight back pain, repositioned with pillows. Wife at bedside. Plan to discharge home with hospice today.  POLST f weights  Outcome: Progressing  Goal: Absence of cardiac arrhythmias or at baseline  Description  INTERVENTIONS:  - Continuous cardiac monitoring, monitor vital signs, obtain 12 lead EKG if indicated  - Evaluate effectiveness of antiarrhythmic and heart rat

## 2020-07-06 NOTE — PLAN OF CARE
Dr. Job Salgado paged to come sign POLST form before discharge. Dr. Juliette Waite notified of K+ 3.4.

## 2020-07-07 ENCOUNTER — APPOINTMENT (OUTPATIENT)
Dept: CARDIOLOGY | Age: 80
End: 2020-07-07

## 2020-07-07 NOTE — DISCHARGE SUMMARY
General Medicine Discharge Summary     Patient ID:  Pranav Tim  [de-identified]year old  7/26/1940    Admit date: 6/17/2020    Discharge date and time: 7/6/2020  1:06 PM     Attending Physician: Je Piper MD    Primary Care Physician: Matt Jasso, SOCIAL WORK  IP CONSULT TO SOCIAL WORK  IP CONSULT TO PHYSICAL THERAPY  IP CONSULT TO OCCUPATIONAL THERAPY  IP CONSULT TO SPIRITUAL CARE  IP CONSULT TO SPIRITUAL CARE    Operative Procedures:      Disposition: home    Patient Instructions:   Discharge Medi daily as needed., Historical    Cholecalciferol (VITAMIN D) 1000 UNITS Oral Tab  Take 1,000 mg by mouth every other day.  , Historical      STOP taking these medications    Metoprolol Succinate ER 25 MG Oral Tablet 24 Hr    furosemide 40 MG Oral Tab    met

## 2020-09-02 ENCOUNTER — TELEPHONE (OUTPATIENT)
Dept: CARDIOLOGY | Age: 80
End: 2020-09-02

## 2020-09-28 RX ORDER — SPIRONOLACTONE 25 MG/1
TABLET ORAL
Qty: 180 TABLET | Refills: 1 | OUTPATIENT
Start: 2020-09-28

## 2020-09-28 NOTE — TELEPHONE ENCOUNTER
Spironolactone 25mg Take 1 tablet BID.      Last filled-7/6/20 for 3 months    LOV-patient not seen in clinic/or hospital    rx denied

## 2020-10-05 ENCOUNTER — TELEPHONE (OUTPATIENT)
Dept: CARDIOLOGY | Age: 80
End: 2020-10-05

## 2021-01-25 DIAGNOSIS — Z23 NEED FOR VACCINATION: ICD-10-CM

## 2021-05-19 NOTE — PROGRESS NOTES
Addended by: LOKI JENNINGS on: 5/19/2021 10:55 AM     Modules accepted: Orders     [facility] Progress [facility]    Subjective:   OOB in chair on exam, remains on IV Bumex/Dobutamine gtts. R IJ tunneled cath placed in IR yesterday for home Dobutrex. He is feeling better with improved diuresis.   He currently denies CP, SOB, dizz is present. PPM Interrogation: 6/8/20:  [address] remote check, reviewed with She device representative. Dual lead ICD, not Bi-V.      Heart Logic reading is 11, which has been increasing steadily since 4/1/20.   Baseline reading has been 6 81 MG Oral Tab EC, Take 1 tablet (81 mg total) by mouth daily. , Disp: 30 tablet, Rfl: 0  amiodarone HCl 200 MG Oral Tab, Take 1 tablet (200 mg total) by mouth daily. , Disp: 90 tablet, Rfl: 3  Meclizine HCl 25 MG Oral Tab, Take 25 mg by mouth 3 (three) time amiodarone  · Hx SVT  · Dm  · Hx bilateral CEA  · Anemia, iron deficient on venofer, Hgb stable 8.4      Plan:  · Continue Dobutamine as palliative inotrope support for Class IV HF  · Continue Bumex 1 mg.hr today - great diuresis - about 14lbs down, conver catheter placement with IR yesterday. It will be used for dobutamine infusion at home.  Dobutamine home was approved based on clinical improvement.     Blood pressure is optimal in low 100s / 40s-50s mmhg with resting heart rate in low 100s beats per minute Isordil for heart failure. Hope renal function will improve a bit with inotrope. Hope we can avoid dialysis in the nearest future. He will follow with renal service outpatient as well. Ambulate as able.   [facility]

## 2021-07-05 NOTE — PROGRESS NOTES
[facility]  Nephrology Progress Note    Gifty Shaffer Patient Status:  Inpatient    1940 MRN [id number]   [facility] 2NE-A Attending Lorena Tinoco MD   Hosp Day # 8 PCP Amanda Reich MD       SUBJECTIVE:    Feels good * 134* 131* 133* 131* 133* 137 135*   K 4.7 4.3 4.3 4.1 4.0 4.2 4.5 4.0    95* 91* 91* 93* 93* 99 99   CO2 29.0 30.0 31.0 35.0* 33.0* 32.0 30.0 29.0   BUN 44* 42* 45* 49* 52* 48* 47* 43*   CREATSERUM 2.39* 2.45* 2.65* 2.60* 2.25* 2.37* 2.21* cap 15 mg, 15 mg, Oral, Nightly PRN  nitroGLYCERIN (NITROSTAT) SL tab 0.4 mg, 0.4 mg, Sublingual, Q5 Min PRN  magnesium hydroxide (MILK OF MAGNESIA) 400 MG/5ML suspension 30 mL, 30 mL, Oral, Daily PRN  Heparin Sodium (Porcine) 5000 UNIT/ML injection 5,000 (4) no impairment

## 2025-02-17 NOTE — PROGRESS NOTES
[facility]  Cardiology Progress Note    Subjective:  No chest pain or shortness of breath. Wife again at bedside. PT re-evaluation yesterday noted and now plans for ISAIAH at discharge.   The patient, myself, and his wife all reviewed the plan and they Left message moderately increased. Wall     thickness was normal. Systolic function was markedly reduced. The     estimated ejection fraction was 15-20%. Severe diffuse hypokinesis with     no identifiable regional variations.  Doppler parameters are consistent     with therapy. · Lower extremity cellulitis  · Hyponatremia, Na 132 today  · Right heel wound - need to reconsult wound care  · LUE edema - venous doppler negative for DVT  · Anemia - Hgb 7.9 yesterday. Has had some intermittent epistaxis, none today.   · Throm

## (undated) NOTE — ED AVS SNAPSHOT
Mr. Mary Resendiz   MRN: [id number]    Department:  [facility] Emergency Department   Date of Visit:  2/4/2020           Disclosure     Insurance plans vary and the physician(s) referred by the ER may not be covered by your plan.  Please contac tell this physician (or your personal doctor if your instructions are to return to your personal doctor) about any new or lasting problems. The primary care or specialist physician will see patients referred from the [facility] Emergency Department.  Miriam Dugan

## (undated) NOTE — IP AVS SNAPSHOT
[address]            (For Outpatient Use Only) Initial Admit Date: 6/17/2020   Inpt/Obs Admit Date: Inpt: 6/18/20 / Obs: 06/17/20   Discharge Date:    Radha Chris:  [de-identified]   MRN: [de-identified]   CSN: [id number]   CEID: [id number] Subscriber ID: [id number] Pt Rel to Subscriber: SELF   TERTIARY INSURANCE   Payor:  Plan:    Group Number:  Insurance Type:    Subscriber Name:  Subscriber :    Subscriber ID:  Pt Rel to Subscriber:    Hospital Account Financial Class: Medicare    Jul

## (undated) NOTE — LETTER
Consent to Procedure/Sedation    Date: 6/10/2020    Time: _______________    1. I authorize the performance upon [facility] the following: Tunneled Central Venous Catheter Insertion    2.  I authorize Dr. Mona Solo (and whomever is designated as the doc ___________________________    ___________________    Witness: ____________________     Date: ______________    Printed: 6/10/2020   11:22 AM    Patient Name: Kerline Patel        : 1940       Medical Record #: [id number]

## (undated) NOTE — LETTER
[facility] [address], [address]  Consent for Procedure/Sedation    Date:January 98,2766    Time: 1800      1.  I authorize the performance upon Jaylen Hyman the following:cardiac catheterization, left ventricular cineangiogr period, the physician will determine when the applicable recovery period ends for purposes of reinstating the Do Not Resuscitate (DNR) order.     Signature of Patient: ____________________________________________________    Signature of person authorized